# Patient Record
Sex: FEMALE | Race: WHITE | Employment: UNEMPLOYED | URBAN - METROPOLITAN AREA
[De-identification: names, ages, dates, MRNs, and addresses within clinical notes are randomized per-mention and may not be internally consistent; named-entity substitution may affect disease eponyms.]

---

## 2017-01-11 PROBLEM — O98.513 HERPES INFECTION DURING PREGNANCY IN THIRD TRIMESTER: Status: ACTIVE | Noted: 2017-01-11

## 2017-01-11 PROBLEM — B00.9 HERPES INFECTION DURING PREGNANCY IN THIRD TRIMESTER: Status: ACTIVE | Noted: 2017-01-11

## 2017-02-16 ENCOUNTER — HOSPITAL ENCOUNTER (EMERGENCY)
Age: 22
Discharge: HOME | DRG: 560 | End: 2017-02-17
Attending: OBSTETRICS & GYNECOLOGY | Admitting: OBSTETRICS & GYNECOLOGY
Payer: COMMERCIAL

## 2017-02-16 NOTE — IP AVS SNAPSHOT
[address] [place] [address] 
[phone] Patient: Joey Reyes MRN: [id number] QBE:[id number] You are allergic to the following Allergen Reactions Pertussis Vaccine,Fluid Unknown (comments) Recent Documentation Height Weight BMI OB Status Smoking Status 1.549 m 74.4 kg 30.99 kg/m2 Pregnant Former Smoker Emergency Contacts Name Discharge Info Relation Home Work Mobile [facility]  Mother [14] [phone] About your hospitalization You were admitted on:  N/A You last received care in the:  E 4 ANTEPARTUM You were discharged on:  February 17, 2017 Unit phone number:  [phone] Why you were hospitalized Your primary diagnosis was:  Not on File Providers Seen During Your Hospitalizations Provider Role Specialty Primary office phone Cassandra Garner MD Attending Provider Obstetrics & Gynecology [phone] Your Primary Care Physician (PCP) Primary Care Physician Office Phone Office Fax Federica Olguin [phone] Follow-up Information Follow up With Details Comments Contact Info Tara Dao MD   [address] [place] [id number] 
[phone] Your Appointments Wednesday February 22, 2017 10:30 AM EST  
OB Test with Cassandra Garner MD  
[facility] ([facility]) [address] [address] [phone] Current Discharge Medication List  
  
ASK your doctor about these medications Dose & Instructions Dispensing Information Comments Morning Noon Evening Bedtime  
 acyclovir 400 mg tablet Commonly known as:  ZOVIRAX Your next dose is: Today, Tomorrow Other:  _________ Take one tab PO BID for remainder of pregnancy Quantity:  60 Tab Refills:  1 prenatal no. 39-iron-FA #6-dha 30 mg iron-1 mg -300 mg Cmpk Your next dose is: Today, Tomorrow Other:  _________ Dose:  1 Cap Take 1 Cap by mouth daily. Quantity:  90 Each Refills:  4 Discharge Instructions Week 38 of Your Pregnancy: Care Instructions Your Care Instructions Believe it or not, your baby is almost here. You may have ideas about your baby's personality because of how much he or she moves. Or you may have noticed how he or she responds to sounds, warmth, cold, and light. You may even know what kind of music your baby likes. By now, you have a better idea of what to expect during delivery. You may have talked about your birth preferences with your doctor. But even if you want a vaginal birth, it is a good idea to learn about  births.  birth means that your baby is born through a cut (incision) in your lower belly. It is sometimes the best choice for the health of the baby and the mother. This care sheet can help you understand  births. It also gives you information about what to expect after your baby is born. And it helps you understand more about postpartum depression. Follow-up care is a key part of your treatment and safety. Be sure to make and go to all appointments, and call your doctor if you are having problems. It's also a good idea to know your test results and keep a list of the medicines you take. How can you care for yourself at home? Learn about  birth · Most C-sections are unplanned. They are done because of problems that occur during labor. These problems might include: 
¨ Labor that slows or stops. ¨ High blood pressure or other problems for the mother. ¨ Signs of distress in the baby. These signs may include a very fast or slow heart rate.  
· Although most mothers and babies do well after , it is major surgery. It has more risks than a vaginal delivery. · In some cases, a planned  may be safer than a vaginal delivery. This may be the case if: ¨ The mother has a health problem, such as a heart condition. ¨ The baby isn't in a head-down position for delivery. This is called a breech position. ¨ The uterus has scars from past surgeries. This could increase the chance of a tear in the uterus. ¨ There is a problem with the placenta. ¨ The mother has an infection, such as genital herpes, that could be spread to the baby. ¨ The mother is having twins or more. ¨ The baby weighs 9 to 10 pounds or more. · Because of the risks of , planned C-sections generally should be done only for medical reasons. And a planned  should be done at 39 weeks or later unless there is a medical reason to do it sooner. Know what to expect after delivery, and plan for the first few weeks at home · You, your baby, and your partner or  will get identification bands. Only people with matching bands can  the baby from the nursery. · You will learn how to feed, diaper, and bathe your baby. And you will learn how to care for the umbilical cord stump. If your baby will be circumcised, you will also learn how to care for that. · Ask people to wait to visit you until you are at home. And ask them to wash their hands before they touch your baby. · Make sure you have another adult in your home for at least 2 or 3 days after the birth. · During the first 2 weeks, limit when friends and family can visit. · Do not allow visitors who have colds or infections. Make sure all visitors are up to date with their vaccinations. Never let anyone smoke around your baby. · Try to nap when the baby naps. Be aware of postpartum depression · \"Baby blues\" are common for the first 1 to 2 weeks after birth. You may cry or feel sad or irritable for no reason. · For some women, these feelings last longer and are more intense. This is called postpartum depression. · If your symptoms last for more than a few weeks or you feel very depressed, ask your doctor for help. · Postpartum depression can be treated. Support groups and counseling can help. Sometimes medicine can also help. Where can you learn more? Go to http://abran-dar.info/. Enter B044 in the search box to learn more about \"Week 38 of Your Pregnancy: Care Instructions. \" Current as of: May 30, 2016 Content Version: 11.1 © 5054-3839 Spinback. Care instructions adapted under license by [employer] (which disclaims liability or warranty for this information). If you have questions about a medical condition or this instruction, always ask your healthcare professional. [place] [address] any warranty or liability for your use of this information. Pregnancy Precautions: Care Instructions Your Care Instructions There is no sure way to prevent labor before your due date ( labor) or to prevent most other pregnancy problems. But there are things you can do to increase your chances of a healthy pregnancy. Go to your appointments, follow your doctor's advice, and take good care of yourself. Eat well, and exercise (if your doctor agrees). And make sure to drink plenty of water. Follow-up care is a key part of your treatment and safety. Be sure to make and go to all appointments, and call your doctor if you are having problems. It's also a good idea to know your test results and keep a list of the medicines you take. How can you care for yourself at home? · Make sure you go to your prenatal appointments. At each visit, your doctor will check your blood pressure. Your doctor will also check to see if you have protein in your urine. High blood pressure and protein in urine are signs of preeclampsia.  This condition can be dangerous for you and your baby. · Drink plenty of fluids, enough so that your urine is light yellow or clear like water. Dehydration can cause contractions. If you have kidney, heart, or liver disease and have to limit fluids, talk with your doctor before you increase the amount of fluids you drink. · Tell your doctor right away if you notice any symptoms of an infection, such as: ¨ Burning when you urinate. ¨ A foul-smelling discharge from your vagina. ¨ Vaginal itching. ¨ Unexplained fever. ¨ Unusual pain or soreness in your uterus or lower belly. · Eat a balanced diet. Include plenty of foods that are high in calcium and iron. ¨ Foods high in calcium include milk, cheese, yogurt, almonds, and broccoli. ¨ Foods high in iron include red meat, shellfish, poultry, eggs, beans, raisins, whole-grain bread, and leafy green vegetables. · Do not smoke. If you need help quitting, talk to your doctor about stop-smoking programs and medicines. These can increase your chances of quitting for good. · Do not drink alcohol or use illegal drugs. · Follow your doctor's directions about activity. Your doctor will let you know how much, if any, exercise you can do. · Ask your doctor if you can have sex. If you are at risk for early labor, your doctor may ask you to not have sex. · Take care to prevent falls. During pregnancy, your joints are loose, and your balance is off. Sports such as bicycling, skiing, or in-line skating can increase your risk of falling. And don't ride horses or motorcycles, dive, water ski, scuba dive, or parachute jump while you are pregnant. · Avoid getting very hot. Do not use saunas or hot tubs. Avoid staying out in the sun in hot weather for long periods. Take acetaminophen (Tylenol) to lower a high fever. · Do not take any over-the-counter or herbal medicines or supplements without talking to your doctor or pharmacist first. 
When should you call for help? Call 911 anytime you think you may need emergency care. For example, call if: 
· You passed out (lost consciousness). · You have severe vaginal bleeding. · You have severe pain in your belly or pelvis. · You have had fluid gushing or leaking from your vagina and you know or think the umbilical cord is bulging into your vagina. If this happens, immediately get down on your knees so your rear end (buttocks) is higher than your head. This will decrease the pressure on the cord until help arrives. Call your doctor now or seek immediate medical care if: 
· You have signs of preeclampsia, such as: 
¨ Sudden swelling of your face, hands, or feet. ¨ New vision problems (such as dimness or blurring). ¨ A severe headache. · You have any vaginal bleeding. · You have belly pain or cramping. · You have a fever. · You have had regular contractions (with or without pain) for an hour. This means that you have 8 or more within 1 hour or 4 or more in 20 minutes after you change your position and drink fluids. · You have a sudden release of fluid from your vagina. · You have low back pain or pelvic pressure that does not go away. · You notice that your baby has stopped moving or is moving much less than normal. 
Watch closely for changes in your health, and be sure to contact your doctor if you have any problems. Where can you learn more? Go to http://abran-dar.info/. Enter [id number] in the search box to learn more about \"Pregnancy Precautions: Care Instructions. \" Current as of: May 30, 2016 Content Version: 11.1 © 0601-3512 [employer]iMine Software. Care instructions adapted under license by [employer] (which disclaims liability or warranty for this information). If you have questions about a medical condition or this instruction, always ask your healthcare professional. [address] any warranty or liability for your use of this information. Discharge Orders None Introducing [facility]! Dear Sukhdeep Boothe: 
Thank you for requesting a i-drive account. Our records indicate that you already have an active i-drive account. You can access your account anytime at [email or web address]. "[employer]/iVerse Media Did you know that you can access your hospital and ER discharge instructions at any time in i-drive? You can also review all of your test results from your hospital stay or ER visit. Additional Information If you have questions, please visit the Frequently Asked Questions section of the i-drive website at [email or web address]. "[email or web address]/iVerse Media/. Remember, i-drive is NOT to be used for urgent needs. For medical emergencies, dial 911. Now available from your iPhone and Android! General Information Please provide this summary of care documentation to your next provider. Patient Signature:  ____________________________________________________________ Date:  ____________________________________________________________  
  
Alpesh Leal Provider Signature:  ____________________________________________________________ Date:  ____________________________________________________________

## 2017-02-16 NOTE — IP AVS SNAPSHOT
Summary of Care Report The Summary of Care report has been created to help improve care coordination. Users with access to [employer] or [address] (Web-based application) may access additional patient information including the Discharge Summary. If you are not currently a [address] user and need more information, please call the number listed below in the [place] [address] section and ask to be connected with Medical Records. Facility Information Name Address Phone [address] [place] [address] [address] [place] [address] [phone] Patient Information Patient Name Sex JACK Irving ([id number]) Female 1995 Discharge Information Admitting Provider Service Area Unit Parish Randolph MD / [address] [facility] 4 Antepartum / [phone] Discharge Provider Discharge Date/Time Discharge Disposition Destination (none) 2017 Morning (Pending) AHR (none) Patient Language Language ENGLISH [13] Problem List as of 2017  Date Reviewed: 2017 Codes Priority Class Noted - Resolved Primigravida in third trimester ICD-10-CM: Z34.03 
ICD-9-CM: V22.0   2016 - Present Overview Addendum 2/15/2017 11:13 AM by Denae Garcia MD  
  EDC of 3/1/17 by 9 2/7 wk US Educated patient of signs and symptoms of labor including but not limited to regular uterine contractions every 5-7 minutes for 1 hour, vaginal bleeding or leakage of fluid to seek immediate care. 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  
  No prodrome or lesions noted D/W pt cont propho and if she has s/sx of ANY lesions or prodrome that C/S would be indicated You are allergic to the following Allergen Reactions Pertussis Vaccine,Fluid Unknown (comments) Current Discharge Medication List  
  
ASK your doctor about these medications Dose & Instructions Dispensing Information Comments  
 acyclovir 400 mg tablet Commonly known as:  ZOVIRAX Take one tab PO BID for remainder of pregnancy Quantity:  60 Tab Refills:  1  
   
 prenatal no. 39-iron-FA #6-dha 30 mg iron-1 mg -300 mg Cmpk Dose:  1 Cap Take 1 Cap by mouth daily. Quantity:  90 Each Refills:  4 Follow-up Information Follow up With Details Comments Contact Info Nae Liu MD   [address] [facility] [address] 
[phone] Discharge Instructions Week 38 of Your Pregnancy: Care Instructions Your Care Instructions Believe it or not, your baby is almost here. You may have ideas about your baby's personality because of how much he or she moves. Or you may have noticed how he or she responds to sounds, warmth, cold, and light. You may even know what kind of music your baby likes. By now, you have a better idea of what to expect during delivery. You may have talked about your birth preferences with your doctor. But even if you want a vaginal birth, it is a good idea to learn about  births.  birth means that your baby is born through a cut (incision) in your lower belly. It is sometimes the best choice for the health of the baby and the mother. This care sheet can help you understand  births. It also gives you information about what to expect after your baby is born. And it helps you understand more about postpartum depression. Follow-up care is a key part of your treatment and safety. Be sure to make and go to all appointments, and call your doctor if you are having problems. It's also a good idea to know your test results and keep a list of the medicines you take. How can you care for yourself at home? Learn about  birth · Most C-sections are unplanned. They are done because of problems that occur during labor. These problems might include: 
¨ Labor that slows or stops. ¨ High blood pressure or other problems for the mother. ¨ Signs of distress in the baby. These signs may include a very fast or slow heart rate. · Although most mothers and babies do well after , it is major surgery. It has more risks than a vaginal delivery. · In some cases, a planned  may be safer than a vaginal delivery. This may be the case if: ¨ The mother has a health problem, such as a heart condition. ¨ The baby isn't in a head-down position for delivery. This is called a breech position. ¨ The uterus has scars from past surgeries. This could increase the chance of a tear in the uterus. ¨ There is a problem with the placenta. ¨ The mother has an infection, such as genital herpes, that could be spread to the baby. ¨ The mother is having twins or more. ¨ The baby weighs 9 to 10 pounds or more. · Because of the risks of , planned C-sections generally should be done only for medical reasons. And a planned  should be done at 39 weeks or later unless there is a medical reason to do it sooner. Know what to expect after delivery, and plan for the first few weeks at home · You, your baby, and your partner or  will get identification bands. Only people with matching bands can  the baby from the nursery. · You will learn how to feed, diaper, and bathe your baby. And you will learn how to care for the umbilical cord stump. If your baby will be circumcised, you will also learn how to care for that. · Ask people to wait to visit you until you are at home. And ask them to wash their hands before they touch your baby. · Make sure you have another adult in your home for at least 2 or 3 days after the birth. · During the first 2 weeks, limit when friends and family can visit. · Do not allow visitors who have colds or infections. Make sure all visitors are up to date with their vaccinations. Never let anyone smoke around your baby. · Try to nap when the baby naps. Be aware of postpartum depression · \"Baby blues\" are common for the first 1 to 2 weeks after birth. You may cry or feel sad or irritable for no reason. · For some women, these feelings last longer and are more intense. This is called postpartum depression. · If your symptoms last for more than a few weeks or you feel very depressed, ask your doctor for help. · Postpartum depression can be treated. Support groups and counseling can help. Sometimes medicine can also help. Where can you learn more? Go to http://abran-dar.info/. Enter B044 in the search box to learn more about \"Week 38 of Your Pregnancy: Care Instructions. \" Current as of: May 30, 2016 Content Version: 11.1 © 7410-1006 Digital Map Products. Care instructions adapted under license by [employer] (which disclaims liability or warranty for this information). If you have questions about a medical condition or this instruction, always ask your healthcare professional. [place] [address] any warranty or liability for your use of this information. Pregnancy Precautions: Care Instructions Your Care Instructions There is no sure way to prevent labor before your due date ( labor) or to prevent most other pregnancy problems. But there are things you can do to increase your chances of a healthy pregnancy. Go to your appointments, follow your doctor's advice, and take good care of yourself. Eat well, and exercise (if your doctor agrees). And make sure to drink plenty of water. Follow-up care is a key part of your treatment and safety.  Be sure to make and go to all appointments, and call your doctor if you are having problems. It's also a good idea to know your test results and keep a list of the medicines you take. How can you care for yourself at home? · Make sure you go to your prenatal appointments. At each visit, your doctor will check your blood pressure. Your doctor will also check to see if you have protein in your urine. High blood pressure and protein in urine are signs of preeclampsia. This condition can be dangerous for you and your baby. · Drink plenty of fluids, enough so that your urine is light yellow or clear like water. Dehydration can cause contractions. If you have kidney, heart, or liver disease and have to limit fluids, talk with your doctor before you increase the amount of fluids you drink. · Tell your doctor right away if you notice any symptoms of an infection, such as: ¨ Burning when you urinate. ¨ A foul-smelling discharge from your vagina. ¨ Vaginal itching. ¨ Unexplained fever. ¨ Unusual pain or soreness in your uterus or lower belly. · Eat a balanced diet. Include plenty of foods that are high in calcium and iron. ¨ Foods high in calcium include milk, cheese, yogurt, almonds, and broccoli. ¨ Foods high in iron include red meat, shellfish, poultry, eggs, beans, raisins, whole-grain bread, and leafy green vegetables. · Do not smoke. If you need help quitting, talk to your doctor about stop-smoking programs and medicines. These can increase your chances of quitting for good. · Do not drink alcohol or use illegal drugs. · Follow your doctor's directions about activity. Your doctor will let you know how much, if any, exercise you can do. · Ask your doctor if you can have sex. If you are at risk for early labor, your doctor may ask you to not have sex. · Take care to prevent falls. During pregnancy, your joints are loose, and your balance is off. Sports such as bicycling, skiing, or in-line skating can increase your risk of falling.  And don't ride horses or motorcycles, dive, water ski, scuba dive, or parachute jump while you are pregnant. · Avoid getting very hot. Do not use saunas or hot tubs. Avoid staying out in the sun in hot weather for long periods. Take acetaminophen (Tylenol) to lower a high fever. · Do not take any over-the-counter or herbal medicines or supplements without talking to your doctor or pharmacist first. 
When should you call for help? Call 911 anytime you think you may need emergency care. For example, call if: 
· You passed out (lost consciousness). · You have severe vaginal bleeding. · You have severe pain in your belly or pelvis. · You have had fluid gushing or leaking from your vagina and you know or think the umbilical cord is bulging into your vagina. If this happens, immediately get down on your knees so your rear end (buttocks) is higher than your head. This will decrease the pressure on the cord until help arrives. Call your doctor now or seek immediate medical care if: 
· You have signs of preeclampsia, such as: 
¨ Sudden swelling of your face, hands, or feet. ¨ New vision problems (such as dimness or blurring). ¨ A severe headache. · You have any vaginal bleeding. · You have belly pain or cramping. · You have a fever. · You have had regular contractions (with or without pain) for an hour. This means that you have 8 or more within 1 hour or 4 or more in 20 minutes after you change your position and drink fluids. · You have a sudden release of fluid from your vagina. · You have low back pain or pelvic pressure that does not go away. · You notice that your baby has stopped moving or is moving much less than normal. 
Watch closely for changes in your health, and be sure to contact your doctor if you have any problems. Where can you learn more? Go to http://abran-dar.info/. Enter [id number] in the search box to learn more about \"Pregnancy Precautions: Care Instructions. \" Current as of: May 30, 2016 Content Version: 11.1 © 6941-1290 [employer]. Care instructions adapted under license by [employer] (which disclaims liability or warranty for this information). If you have questions about a medical condition or this instruction, always ask your healthcare professional. [address] any warranty or liability for your use of this information. Chart Review Routing History No Routing History on File

## 2017-02-17 ENCOUNTER — ANESTHESIA (OUTPATIENT)
Dept: LABOR AND DELIVERY | Age: 22
DRG: 560 | End: 2017-02-17
Payer: COMMERCIAL

## 2017-02-17 ENCOUNTER — ANESTHESIA EVENT (OUTPATIENT)
Dept: LABOR AND DELIVERY | Age: 22
DRG: 560 | End: 2017-02-17
Payer: COMMERCIAL

## 2017-02-17 ENCOUNTER — HOSPITAL ENCOUNTER (INPATIENT)
Age: 22
LOS: 4 days | Discharge: HOME | DRG: 560 | End: 2017-02-21
Attending: OBSTETRICS & GYNECOLOGY | Admitting: OBSTETRICS & GYNECOLOGY
Payer: COMMERCIAL

## 2017-02-17 VITALS
HEIGHT: 61 IN | BODY MASS INDEX: 30.96 KG/M2 | DIASTOLIC BLOOD PRESSURE: 77 MMHG | HEART RATE: 75 BPM | WEIGHT: 164 LBS | TEMPERATURE: 98.2 F | SYSTOLIC BLOOD PRESSURE: 117 MMHG

## 2017-02-17 PROBLEM — Z37.9 NORMAL LABOR: Status: ACTIVE | Noted: 2017-02-17

## 2017-02-17 PROBLEM — Z37.9 NORMAL LABOR: Status: RESOLVED | Noted: 2017-02-17 | Resolved: 2017-02-17

## 2017-02-17 LAB
ABO + RH BLD: NORMAL
BACTERIA SPEC CULT: NORMAL
BLOOD GROUP ANTIBODIES SERPL: NORMAL
ERYTHROCYTE [DISTWIDTH] IN BLOOD BY AUTOMATED COUNT: 13.3 % (ref 11.9–14.6)
ERYTHROCYTE [DISTWIDTH] IN BLOOD BY AUTOMATED COUNT: 13.7 % (ref 11.9–14.6)
FLUAV AG NPH QL IA: NEGATIVE
FLUBV AG NPH QL IA: NEGATIVE
HCT VFR BLD AUTO: 36.9 % (ref 35.8–46.3)
HCT VFR BLD AUTO: 37.5 % (ref 35.8–46.3)
HGB BLD-MCNC: 12.8 G/DL (ref 11.7–15.4)
HGB BLD-MCNC: 13.2 G/DL (ref 11.7–15.4)
MCH RBC QN AUTO: 32 PG (ref 26.1–32.9)
MCH RBC QN AUTO: 32 PG (ref 26.1–32.9)
MCHC RBC AUTO-ENTMCNC: 34.7 G/DL (ref 31.4–35)
MCHC RBC AUTO-ENTMCNC: 35.2 G/DL (ref 31.4–35)
MCV RBC AUTO: 90.8 FL (ref 79.6–97.8)
MCV RBC AUTO: 92.3 FL (ref 79.6–97.8)
PLATELET # BLD AUTO: 115 K/UL (ref 150–450)
PLATELET # BLD AUTO: 136 K/UL (ref 150–450)
PMV BLD AUTO: 11.7 FL (ref 10.8–14.1)
PMV BLD AUTO: 12 FL (ref 10.8–14.1)
RBC # BLD AUTO: 4 M/UL (ref 4.05–5.25)
RBC # BLD AUTO: 4.13 M/UL (ref 4.05–5.25)
SERVICE CMNT-IMP: NORMAL
SPECIMEN EXP DATE BLD: NORMAL
WBC # BLD AUTO: 16.7 K/UL (ref 4.3–11.1)
WBC # BLD AUTO: 20.8 K/UL (ref 4.3–11.1)

## 2017-02-17 PROCEDURE — 4A1HXCZ MONITORING OF PRODUCTS OF CONCEPTION, CARDIAC RATE, EXTERNAL APPROACH: ICD-10-PCS | Performed by: OBSTETRICS & GYNECOLOGY

## 2017-02-17 PROCEDURE — 77030014125 HC TY EPDRL BBMI -B: Performed by: ANESTHESIOLOGY

## 2017-02-17 PROCEDURE — 74011250636 HC RX REV CODE- 250/636

## 2017-02-17 PROCEDURE — 86900 BLOOD TYPING SEROLOGIC ABO: CPT | Performed by: OBSTETRICS & GYNECOLOGY

## 2017-02-17 PROCEDURE — 74011250636 HC RX REV CODE- 250/636: Performed by: OBSTETRICS & GYNECOLOGY

## 2017-02-17 PROCEDURE — 99283 EMERGENCY DEPT VISIT LOW MDM: CPT

## 2017-02-17 PROCEDURE — 74011250637 HC RX REV CODE- 250/637: Performed by: OBSTETRICS & GYNECOLOGY

## 2017-02-17 PROCEDURE — 77010026065 HC OXYGEN MINIMUM MEDICAL AIR

## 2017-02-17 PROCEDURE — 65270000029 HC RM PRIVATE

## 2017-02-17 PROCEDURE — 75410000002 HC LABOR FEE PER 1 HR

## 2017-02-17 PROCEDURE — 87040 BLOOD CULTURE FOR BACTERIA: CPT | Performed by: OBSTETRICS & GYNECOLOGY

## 2017-02-17 PROCEDURE — 77030018846 HC SOL IRR STRL H20 ICUM -A

## 2017-02-17 PROCEDURE — 10907ZC DRAINAGE OF AMNIOTIC FLUID, THERAPEUTIC FROM PRODUCTS OF CONCEPTION, VIA NATURAL OR ARTIFICIAL OPENING: ICD-10-PCS | Performed by: OBSTETRICS & GYNECOLOGY

## 2017-02-17 PROCEDURE — 74011258636 HC RX REV CODE- 258/636: Performed by: OBSTETRICS & GYNECOLOGY

## 2017-02-17 PROCEDURE — 87641 MR-STAPH DNA AMP PROBE: CPT | Performed by: OBSTETRICS & GYNECOLOGY

## 2017-02-17 PROCEDURE — 77030005518 HC CATH URETH FOL 2W BARD -B

## 2017-02-17 PROCEDURE — 85027 COMPLETE CBC AUTOMATED: CPT | Performed by: OBSTETRICS & GYNECOLOGY

## 2017-02-17 PROCEDURE — 75410000003 HC RECOV DEL/VAG/CSECN EA 0.5 HR

## 2017-02-17 PROCEDURE — 77030011945 HC CATH URIN INT ST MENT -A

## 2017-02-17 PROCEDURE — A4300 CATH IMPL VASC ACCESS PORTAL: HCPCS | Performed by: ANESTHESIOLOGY

## 2017-02-17 PROCEDURE — 36415 COLL VENOUS BLD VENIPUNCTURE: CPT | Performed by: OBSTETRICS & GYNECOLOGY

## 2017-02-17 PROCEDURE — 76060000078 HC EPIDURAL ANESTHESIA

## 2017-02-17 PROCEDURE — 87804 INFLUENZA ASSAY W/OPTIC: CPT | Performed by: OBSTETRICS & GYNECOLOGY

## 2017-02-17 PROCEDURE — 77030002888 HC SUT CHRMC J&J -A

## 2017-02-17 PROCEDURE — 74011250636 HC RX REV CODE- 250/636: Performed by: ANESTHESIOLOGY

## 2017-02-17 PROCEDURE — 87205 SMEAR GRAM STAIN: CPT | Performed by: OBSTETRICS & GYNECOLOGY

## 2017-02-17 PROCEDURE — 77030011943

## 2017-02-17 PROCEDURE — 76815 OB US LIMITED FETUS(S): CPT

## 2017-02-17 PROCEDURE — 75410000000 HC DELIVERY VAGINAL/SINGLE

## 2017-02-17 PROCEDURE — 59025 FETAL NON-STRESS TEST: CPT

## 2017-02-17 PROCEDURE — 0HQ9XZZ REPAIR PERINEUM SKIN, EXTERNAL APPROACH: ICD-10-PCS | Performed by: OBSTETRICS & GYNECOLOGY

## 2017-02-17 PROCEDURE — 87086 URINE CULTURE/COLONY COUNT: CPT | Performed by: OBSTETRICS & GYNECOLOGY

## 2017-02-17 RX ORDER — SODIUM CHLORIDE 0.9 % (FLUSH) 0.9 %
5-10 SYRINGE (ML) INJECTION EVERY 8 HOURS
Status: DISCONTINUED | OUTPATIENT
Start: 2017-02-17 | End: 2017-02-17 | Stop reason: HOSPADM

## 2017-02-17 RX ORDER — SODIUM CHLORIDE 0.9 % (FLUSH) 0.9 %
5-10 SYRINGE (ML) INJECTION AS NEEDED
Status: DISCONTINUED | OUTPATIENT
Start: 2017-02-17 | End: 2017-02-17 | Stop reason: HOSPADM

## 2017-02-17 RX ORDER — FENTANYL CITRATE 50 UG/ML
INJECTION, SOLUTION INTRAMUSCULAR; INTRAVENOUS AS NEEDED
Status: DISCONTINUED | OUTPATIENT
Start: 2017-02-17 | End: 2017-02-17 | Stop reason: HOSPADM

## 2017-02-17 RX ORDER — SIMETHICONE 80 MG
80 TABLET,CHEWABLE ORAL
Status: DISCONTINUED | OUTPATIENT
Start: 2017-02-17 | End: 2017-02-21 | Stop reason: HOSPADM

## 2017-02-17 RX ORDER — BUTORPHANOL TARTRATE 1 MG/ML
1 INJECTION INTRAMUSCULAR; INTRAVENOUS ONCE
Status: COMPLETED | OUTPATIENT
Start: 2017-02-17 | End: 2017-02-17

## 2017-02-17 RX ORDER — IBUPROFEN 800 MG/1
800 TABLET ORAL
Status: DISCONTINUED | OUTPATIENT
Start: 2017-02-17 | End: 2017-02-18

## 2017-02-17 RX ORDER — ROPIVACAINE HYDROCHLORIDE 2 MG/ML
INJECTION, SOLUTION EPIDURAL; INFILTRATION; PERINEURAL AS NEEDED
Status: DISCONTINUED | OUTPATIENT
Start: 2017-02-17 | End: 2017-02-17

## 2017-02-17 RX ORDER — OXYTOCIN/RINGER'S LACTATE 30/500 ML
.5-2 PLASTIC BAG, INJECTION (ML) INTRAVENOUS
Status: DISCONTINUED | OUTPATIENT
Start: 2017-02-17 | End: 2017-02-17

## 2017-02-17 RX ORDER — ROPIVACAINE HYDROCHLORIDE 2 MG/ML
INJECTION, SOLUTION EPIDURAL; INFILTRATION; PERINEURAL
Status: DISCONTINUED | OUTPATIENT
Start: 2017-02-17 | End: 2017-02-17 | Stop reason: HOSPADM

## 2017-02-17 RX ORDER — PRENATAL VIT 96/IRON FUM/FOLIC 27MG-0.8MG
1 TABLET ORAL DAILY
Status: DISCONTINUED | OUTPATIENT
Start: 2017-02-18 | End: 2017-02-21 | Stop reason: HOSPADM

## 2017-02-17 RX ORDER — DEXTROSE, SODIUM CHLORIDE, SODIUM LACTATE, POTASSIUM CHLORIDE, AND CALCIUM CHLORIDE 5; .6; .31; .03; .02 G/100ML; G/100ML; G/100ML; G/100ML; G/100ML
125 INJECTION, SOLUTION INTRAVENOUS CONTINUOUS
Status: DISCONTINUED | OUTPATIENT
Start: 2017-02-17 | End: 2017-02-17 | Stop reason: HOSPADM

## 2017-02-17 RX ORDER — BUTORPHANOL TARTRATE 1 MG/ML
1 INJECTION INTRAMUSCULAR; INTRAVENOUS
Status: DISCONTINUED | OUTPATIENT
Start: 2017-02-17 | End: 2017-02-17 | Stop reason: HOSPADM

## 2017-02-17 RX ORDER — MINERAL OIL
120 OIL (ML) ORAL
Status: COMPLETED | OUTPATIENT
Start: 2017-02-17 | End: 2017-02-17

## 2017-02-17 RX ORDER — OXYTOCIN/RINGER'S LACTATE 30/500 ML
PLASTIC BAG, INJECTION (ML) INTRAVENOUS
Status: DISCONTINUED
Start: 2017-02-17 | End: 2017-02-17

## 2017-02-17 RX ORDER — LIDOCAINE HYDROCHLORIDE 20 MG/ML
JELLY TOPICAL
Status: DISCONTINUED | OUTPATIENT
Start: 2017-02-17 | End: 2017-02-17 | Stop reason: HOSPADM

## 2017-02-17 RX ORDER — OXYTOCIN/RINGER'S LACTATE 15/250 ML
250 PLASTIC BAG, INJECTION (ML) INTRAVENOUS ONCE
Status: ACTIVE | OUTPATIENT
Start: 2017-02-17 | End: 2017-02-17

## 2017-02-17 RX ORDER — LIDOCAINE HYDROCHLORIDE 10 MG/ML
1 INJECTION INFILTRATION; PERINEURAL
Status: DISCONTINUED | OUTPATIENT
Start: 2017-02-17 | End: 2017-02-17 | Stop reason: HOSPADM

## 2017-02-17 RX ORDER — PROMETHAZINE HYDROCHLORIDE 25 MG/ML
12.5 INJECTION, SOLUTION INTRAMUSCULAR; INTRAVENOUS ONCE
Status: COMPLETED | OUTPATIENT
Start: 2017-02-17 | End: 2017-02-17

## 2017-02-17 RX ORDER — HYDROCODONE BITARTRATE AND ACETAMINOPHEN 5; 325 MG/1; MG/1
1 TABLET ORAL
Status: DISCONTINUED | OUTPATIENT
Start: 2017-02-17 | End: 2017-02-18

## 2017-02-17 RX ORDER — ACYCLOVIR 200 MG/1
400 CAPSULE ORAL 2 TIMES DAILY
Status: DISCONTINUED | OUTPATIENT
Start: 2017-02-18 | End: 2017-02-18

## 2017-02-17 RX ORDER — OXYTOCIN/RINGER'S LACTATE 15/250 ML
250 PLASTIC BAG, INJECTION (ML) INTRAVENOUS ONCE
Status: ACTIVE | OUTPATIENT
Start: 2017-02-18 | End: 2017-02-18

## 2017-02-17 RX ORDER — FAMOTIDINE 20 MG/1
20 TABLET, FILM COATED ORAL ONCE
Status: DISCONTINUED | OUTPATIENT
Start: 2017-02-17 | End: 2017-02-17

## 2017-02-17 RX ORDER — ACETAMINOPHEN 500 MG
1000 TABLET ORAL ONCE
Status: COMPLETED | OUTPATIENT
Start: 2017-02-17 | End: 2017-02-17

## 2017-02-17 RX ADMIN — SODIUM CHLORIDE, SODIUM LACTATE, POTASSIUM CHLORIDE, CALCIUM CHLORIDE, AND DEXTROSE MONOHYDRATE 125 ML/HR: 600; 310; 30; 20; 5 INJECTION, SOLUTION INTRAVENOUS at 10:57

## 2017-02-17 RX ADMIN — SODIUM CHLORIDE, SODIUM LACTATE, POTASSIUM CHLORIDE, AND CALCIUM CHLORIDE 1000 ML: 600; 310; 30; 20 INJECTION, SOLUTION INTRAVENOUS at 11:28

## 2017-02-17 RX ADMIN — BUTORPHANOL TARTRATE 1 MG: 1 INJECTION, SOLUTION INTRAMUSCULAR; INTRAVENOUS at 02:18

## 2017-02-17 RX ADMIN — SODIUM CHLORIDE, SODIUM LACTATE, POTASSIUM CHLORIDE, AND CALCIUM CHLORIDE 1000 ML: 600; 310; 30; 20 INJECTION, SOLUTION INTRAVENOUS at 10:58

## 2017-02-17 RX ADMIN — OXYTOCIN 2 MILLI-UNITS/MIN: 10 INJECTION, SOLUTION INTRAMUSCULAR; INTRAVENOUS at 14:05

## 2017-02-17 RX ADMIN — MINERAL OIL 120 ML: 471.95 OIL ORAL at 21:00

## 2017-02-17 RX ADMIN — SODIUM CHLORIDE, SODIUM LACTATE, POTASSIUM CHLORIDE, CALCIUM CHLORIDE, AND DEXTROSE MONOHYDRATE 125 ML/HR: 600; 310; 30; 20; 5 INJECTION, SOLUTION INTRAVENOUS at 18:19

## 2017-02-17 RX ADMIN — ROPIVACAINE HYDROCHLORIDE 8 ML/HR: 2 INJECTION, SOLUTION EPIDURAL; INFILTRATION; PERINEURAL at 10:41

## 2017-02-17 RX ADMIN — ACETAMINOPHEN 1000 MG: 500 TABLET, FILM COATED ORAL at 16:07

## 2017-02-17 RX ADMIN — PROMETHAZINE HYDROCHLORIDE 12.5 MG: 25 INJECTION INTRAMUSCULAR; INTRAVENOUS at 02:18

## 2017-02-17 RX ADMIN — FENTANYL CITRATE 100 MCG: 50 INJECTION, SOLUTION INTRAMUSCULAR; INTRAVENOUS at 19:57

## 2017-02-17 NOTE — ANESTHESIA PREPROCEDURE EVALUATION
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            Physical Exam    Airway  Mallampati: II  TM Distance: 4 - 6 cm  Neck ROM: normal range of motion   Mouth opening: Normal     Cardiovascular    Rhythm: regular           Dental  No notable dental hx       Pulmonary                 Abdominal         Other Findings            Anesthetic Plan    ASA: 2  Anesthesia type: epidural            Anesthetic plan and risks discussed with: Patient, Spouse and Mother

## 2017-02-17 NOTE — ANESTHESIA PROCEDURE NOTES
Epidural Block    Performed by: Veronica Holman  Authorized by: Veronica Holman     Pre-Procedure  Indication: labor epidural    Preanesthetic Checklist: patient identified, risks and benefits discussed, anesthesia consent, patient being monitored, timeout performed and anesthesia consent    Timeout Time: 10:34        Epidural:   Patient position:  Seated  Prep region:  Lumbar  Prep: Patient draped and Chlorhexidine    Location:  L3-4    Needle and Epidural Catheter:   Needle Type:  Tuohy  Needle Gauge:  17 G  Injection Technique:  Loss of resistance using air  Attempts:  1  Catheter Size:  19 G  Events: no blood with aspiration, no cerebrospinal fluid with aspiration, no paresthesia and negative aspiration test    Test Dose:  Lidocaine 1.5% w/ epi and negative    Assessment:   Catheter Secured:  Tegaderm and tape  Insertion:  Uncomplicated  Patient tolerance:  Patient tolerated the procedure well with no immediate complications  All needles out intact, procedure tolerated well without problems

## 2017-02-17 NOTE — PROGRESS NOTES
Pt seen and examined as f/u from Dr. Catarina Will evaluation at 0700. Pt is 24 yo G1 at 38.2 presenting multiple times overnight with painful contractions. Pt states she is in pain and can not continue like this. No bleeding, no LOF    Gen writhing with contractions  Abd gravid, NT   4/80/-2 vtx, intact, adequate pelvis, EFW 6+10    Bedside US shows IUP cephalic pres, ant, grade 3 placenta, VERONICA 10 cm    A/ 22 o G1 at 38.2 admitted in labor  Plan/  1. Fetal status reassuring  2. GBS negative  3. Admit for routine intrapartum care, primary OB aware. 4. Epidural for pain control ASAP  5. Anxiety  6.  HSV- has been on suppression, no prodromal sx, exam done by Dr. Balinda Moritz at 0100 today was negative for Meena Boss MD

## 2017-02-17 NOTE — PROGRESS NOTES
Labor Progress Note  Patient seen, fetal heart rate and contraction pattern evaluated, patient examined.   Patient Vitals for the past 1 hrs:   BP Temp Pulse   02/17/17 1541 - (!) 103.1 °F (39.5 °C) -   02/17/17 1531 123/62 - 91       Physical Exam:  Cervical Exam:  7-8 cm dilated    100% effaced    -1 station      Uterine Activity: Frequency: Every 2-4 minutes and Intensity: moderate  Fetal Heart Rate: tachycardia most likely due to maternal fever    Variability: moderate, minimal      Assessment/Plan:  Blood and urine cultures obtained   Swab for Flu  Tylenol for fever  Continue plan for vaginal delivery

## 2017-02-17 NOTE — PROGRESS NOTES
02/17/17 1730   Cervical Exam   Dilation (cm) 8   Eff 100 %   Station 0   Repositioned to high fowlers position

## 2017-02-17 NOTE — PROGRESS NOTES
02/17/17 1545   Straight Cath   Straight Cath Nurse performed cath   Number of Attempts 1   Catheter Size 16 FR   Time Catheter Inserted 1545   Time Catheter Removed 1546   Urine 75 mL   Urine Assessment   Urinary Status Straight cath   Urine Color Jenna   Urine Appearance Clear   Urine culture collected and sent to lab

## 2017-02-17 NOTE — PROGRESS NOTES
Faviola Colunga at bedside at 21 . SHAI Gayle at bedside at 1038     Assisted pt to sitting up on bedside at 1030.     Timeout completed at 1031 with MD, SHAI and myself at bedside.     Test dose given at 1040. Negative reaction.     Dose given at 1043.     Pt assisted to lying back in left tilt position.     See anesthesia record for details. See vital sign flow sheet for BP.     Tolerated procedure well.

## 2017-02-17 NOTE — PROGRESS NOTES
Pt transferred to room 436 for labor. Iv started and oriented to plan of care. Pt would like her epidural asap.

## 2017-02-17 NOTE — PROGRESS NOTES
Patient discharged to home undelivered with instructions to return for signs of labor. Voiced understanding. Patient left ambulatory accompanied by FOB and patient's mother.

## 2017-02-17 NOTE — IP AVS SNAPSHOT
Current Discharge Medication List  
  
Take these medications at their scheduled times Dose & Instructions Dispensing Information Comments Morning Noon Evening Bedtime  
 citalopram 20 mg tablet Commonly known as:  Ruth Gonzalez Your next dose is: Today, Tomorrow Other:  ____________ Dose:  20 mg Take 1 Tab by mouth daily. Quantity:  30 Tab Refills:  2  
     
   
   
   
  
 norethindrone 0.35 mg Tab Commonly known as:  Gaudencio & Gaudencio Your next dose is: Today, Tomorrow Other:  ____________ Dose:  1 Tab Take 1 Tab by mouth daily. Quantity:  1 Package Refills:  12  
     
   
   
   
  
 prenatal no. 39-iron-FA #6-dha 30 mg iron-1 mg -300 mg Cmpk Your next dose is: Today, Tomorrow Other:  ____________ Dose:  1 Cap Take 1 Cap by mouth daily. Quantity:  90 Each Refills:  4 Take these medications as needed Dose & Instructions Dispensing Information Comments Morning Noon Evening Bedtime  
 ibuprofen 600 mg tablet Commonly known as:  MOTRIN Your next dose is: Today, Tomorrow Other:  ____________ Dose:  600 mg Take 1 Tab by mouth every six (6) hours as needed for Pain. Quantity:  60 Tab Refills:  2  
     
   
   
   
  
 oxyCODONE IR 5 mg immediate release tablet Commonly known as:  Chato Lambert Your next dose is: Today, Tomorrow Other:  ____________ Dose:  5 mg Take 1 Tab by mouth every eight (8) hours as needed. Max Daily Amount: 15 mg. Quantity:  24 Tab Refills:  0 Where to Get Your Medications These medications were sent to [address] [facility], [address] at [facility]  [address] [place] [address] Phone:  [phone]  
  citalopram 20 mg tablet  
 ibuprofen 600 mg tablet  
 norethindrone 0.35 mg Tab Information about where to get these medications is not yet available ! Ask your nurse or doctor about these medications  
  oxyCODONE IR 5 mg immediate release tablet

## 2017-02-17 NOTE — PROGRESS NOTES
Dr Katheran Severance at bedside. Pt checked for active herpes lesions. None noted. SVE 3-3.5/70/-1. Will let patient walk for an hour and recheck cervix.

## 2017-02-17 NOTE — PROGRESS NOTES
Reactive NST. Pt oob to walk. Pt walking in her room currently. Will continue to monitor x2 hours per orders of Dr Bandar Madison.

## 2017-02-17 NOTE — IP AVS SNAPSHOT
[address] 
[phone] Patient: Shirley Reveles MRN: [id number] HYT: You are allergic to the following Allergen Reactions Pertussis Vaccine,Fluid Unknown (comments) Recent Documentation Weight Breastfeeding? BMI OB Status Smoking Status 74.4 kg Unknown 30.99 kg/m2 Recent pregnancy Former Smoker Unresulted Labs Order Current Status AEROBIC SUSCEPTIBILITY ID In process CULTURE, BLOOD Preliminary result CULTURE, BLOOD Preliminary result Emergency Contacts Name Discharge Info Relation Home Work Mobile [facility]  Mother [14] [phone] About your hospitalization You were admitted on:  2017 You last received care in the:  [address] [facility] You were discharged on:  2017 Unit phone number:  [phone] Why you were hospitalized Your primary diagnosis was:  Normal Labor Your diagnoses also included:  Herpes Infection During Pregnancy In Third Trimester,  (Spontaneous Vaginal Delivery), Maternal Fever During Labor, Bacteremia Due To Gram-Positive Bacteria Providers Seen During Your Hospitalizations Provider Role Specialty Primary office phone Alexandria Norman MD Attending Provider Obstetrics & Gynecology [phone] Your Primary Care Physician (PCP) Primary Care Physician Office Phone Office Fax [facility] [phone] Follow-up Information Follow up With Details Comments Contact Info Willian Hong MD   [facility] [address] 
[phone] Alexandria Norman MD In 6 weeks  [address] [place] [address] [facility] [address] 
[phone] Current Discharge Medication List  
  
START taking these medications Dose & Instructions Dispensing Information Comments Morning Noon Evening Bedtime  
 citalopram 20 mg tablet Commonly known as:  Anahola Party Your next dose is: Today, Tomorrow Other:  _________ Dose:  20 mg Take 1 Tab by mouth daily. Quantity:  30 Tab Refills:  2  
     
   
   
   
  
 ibuprofen 600 mg tablet Commonly known as:  MOTRIN Your next dose is: Today, Tomorrow Other:  _________ Dose:  600 mg Take 1 Tab by mouth every six (6) hours as needed for Pain. Quantity:  60 Tab Refills:  2  
     
   
   
   
  
 norethindrone 0.35 mg Tab Commonly known as:  Gaudencio & Gaudencio Your next dose is: Today, Tomorrow Other:  _________ Dose:  1 Tab Take 1 Tab by mouth daily. Quantity:  1 Package Refills:  12  
     
   
   
   
  
 oxyCODONE IR 5 mg immediate release tablet Commonly known as:  Dondra Concepción Your next dose is: Today, Tomorrow Other:  _________ Dose:  5 mg Take 1 Tab by mouth every eight (8) hours as needed. Max Daily Amount: 15 mg. Quantity:  24 Tab Refills:  0 CONTINUE these medications which have NOT CHANGED Dose & Instructions Dispensing Information Comments Morning Noon Evening Bedtime  
 prenatal no. 39-iron-FA #6-dha 30 mg iron-1 mg -300 mg Cmpk Your next dose is: Today, Tomorrow Other:  _________ Dose:  1 Cap Take 1 Cap by mouth daily. Quantity:  90 Each Refills:  4 STOP taking these medications   
 acyclovir 400 mg tablet Commonly known as:  ZOVIRAX Where to Get Your Medications These medications were sent to [facility] [address] at [address] [address] Phone:  [phone]  
  citalopram 20 mg tablet  
 ibuprofen 600 mg tablet  
 norethindrone 0.35 mg Tab Information on where to get these meds will be given to you by the nurse or doctor. ! Ask your nurse or doctor about these medications  
  oxyCODONE IR 5 mg immediate release tablet Discharge Instructions Discharge instruction to follow: Activity: Pelvis rest for 6 weeks No heavy lifting over 15 lbs for 2 weeks No driving for 2 weeks No push/pull motion such as sweeping or vacuuming for 2 weeks No tub baths for 6 weeks If using pierre-bottle continue to use until comfortable stopping. Change sanitary pad after each urination or bowel movement. Call MD for the following: 
    Fever over 101 F; pain not relieved by medication; foul smelling vaginal discharge or an increase in vaginal bleeding. Take medication as prescribed. Follow up with MD as order. After Your Delivery (the Postpartum Period): Care Instructions Your Care Instructions Congratulations on the birth of your baby. Like pregnancy, the  period can be a time of excitement, cortney, and exhaustion. You may look at your wondrous little baby and feel happy. You may also be overwhelmed by your new sleep hours and new responsibilities. At first, babies often sleep during the days and are awake at night. They do not have a pattern or routine. They may make sudden gasps, jerk themselves awake, or look like they have crossed eyes. These are all normal, and they may even make you smile. In these first weeks after delivery, try to take good care of yourself. It may take 4 to 6 weeks to feel like yourself again, and possibly longer if you had a  birth. You will likely feel very tired for several weeks. Your days will be full of ups and downs, but lots of cortney as well. Follow-up care is a key part of your treatment and safety. Be sure to make and go to all appointments, and call your doctor if you are having problems. It's also a good idea to know your test results and keep a list of the medicines you take. How can you care for yourself at home? Take care of your body after delivery · Use pads instead of tampons for the bloody flow that may last as long as 2 weeks. · Ease cramps with ibuprofen (Advil, Motrin). · Ease soreness of hemorrhoids and the area between your vagina and rectum with ice compresses or witch hazel pads. · Ease constipation by drinking lots of fluid and eating high-fiber foods. Ask your doctor about over-the-counter stool softeners. · Cleanse yourself with a gentle squeeze of warm water from a bottle instead of wiping with toilet paper. · Take a sitz bath in warm water several times a day. · Wear a good nursing bra. Ease sore and swollen breasts with warm, wet washcloths. · If you are not breastfeeding, use ice rather than heat for breast soreness. · Your period may not start for several months if you are breastfeeding. You may bleed more, and longer at first, than you did before you got pregnant. · Wait until you are healed (about 4 to 6 weeks) before you have sexual intercourse. Your doctor will tell you when it is okay to have sex. · Try not to travel with your baby for 5 or 6 weeks. If you take a long car trip, make frequent stops to walk around and stretch. Avoid exhaustion · Rest every day. Try to nap when your baby naps. · Ask another adult to be with you for a few days after delivery. · Plan for  if you have other children. · Stay flexible so you can eat at odd hours and sleep when you need to. Both you and your baby are making new schedules. · Plan small trips to get out of the house. Change can make you feel less tired. · Ask for help with housework, cooking, and shopping. Remind yourself that your job is to care for your baby. Know about help for postpartum depression · \"Baby blues\" are common for the first 1 to 2 weeks after birth. You may cry or feel sad or irritable for no reason. · Rest whenever you can. Being tired makes it harder to handle your emotions. · Go for walks with your baby. · Talk to your partner, friends, and family about your feelings. · If your symptoms last for more than a few weeks, or if you feel very depressed, ask your doctor for help. · Postpartum depression can be treated. Support groups and counseling can help. Sometimes medicine can also help. Stay healthy · Eat healthy foods so you have more energy, make good breast milk, and lose extra baby pounds. · If you breastfeed, avoid alcohol and drugs. Stay smoke-free. If you quit during pregnancy, congratulations. · Start daily exercise after 4 to 6 weeks, but rest when you feel tired. · Learn exercises to tone your belly. Do Kegel exercises to regain strength in your pelvic muscles. You can do these exercises while you stand or sit. ¨ Squeeze the same muscles you would use to stop your urine. Your belly and thighs should not move. ¨ Hold the squeeze for 3 seconds, and then relax for 3 seconds. ¨ Start with 3 seconds. Then add 1 second each week until you are able to squeeze for 10 seconds. ¨ Repeat the exercise 10 to 15 times for each session. Do three or more sessions each day. · Find a class for new mothers and new babies that has an exercise time. · If you had a  birth, give yourself a bit more time before you exercise, and be careful. When should you call for help? Call 911 anytime you think you may need emergency care. For example, call if: 
· You passed out (lost consciousness). Call your doctor now or seek immediate medical care if: 
· You have severe vaginal bleeding. This means you are passing blood clots and soaking through a pad each hour for 2 or more hours. · You are dizzy or lightheaded, or you feel like you may faint. · You have a fever. · You have new belly pain, or your pain gets worse. Watch closely for changes in your health, and be sure to contact your doctor if: 
· Your vaginal bleeding seems to be getting heavier. · You have new or worse vaginal discharge. · You feel sad, anxious, or hopeless for more than a few days. · You do not get better as expected. Where can you learn more? Go to http://abran-dar.info/. Enter A461 in the search box to learn more about \"After Your Delivery (the Postpartum Period): Care Instructions. \" Current as of: May 30, 2016 Content Version: 11.1 © 8307-7104 [employer]. Care instructions adapted under license by [employer] (which disclaims liability or warranty for this information). If you have questions about a medical condition or this instruction, always ask your healthcare professional. [place] [address] any warranty or liability for your use of this information. Discharge Orders None [employer] Announcement We are excited to announce that we are making your provider's discharge notes available to you in [employer]. You will see these notes when they are completed and signed by the physician that discharged you from your recent hospital stay. If you have any questions or concerns about any information you see in [employer], please call the Health Information Department where you were seen or reach out to your Primary Care Provider for more information about your plan of care. Introducing [facility]! Dear Tom Hilario: 
Thank you for requesting a [employer] account. Our records indicate that you already have an active [employer] account. You can access your account anytime at [email or web address] Did you know that you can access your hospital and ER discharge instructions at any time in [employer]? You can also review all of your test results from your hospital stay or ER visit. Additional Information If you have questions, please visit the Frequently Asked Questions section of the [employer] website at [email or web address] Remember, [employer] is NOT to be used for urgent needs. For medical emergencies, dial 911. Now available from your iPhone and Android! General Information Please provide this summary of care documentation to your next provider. Patient Signature:  ____________________________________________________________ Date:  ____________________________________________________________  
  
Brian Naas Provider Signature:  ____________________________________________________________ Date:  ____________________________________________________________

## 2017-02-17 NOTE — PROGRESS NOTES
Labor Progress Note  Patient seen, fetal heart rate and contraction pattern evaluated, patient examined. Physical Exam:  Cervical Exam:  5 cm dilated    90% effaced    -1 station    Membranes:  Artificial Rupture of Membranes;  Amniotic Fluid: medium amount of clear fluid  Uterine Activity: Frequency: Every 3-4 minutes and Intensity: moderate    Assessment/Plan:  Reassuring fetal status, Continue plan for vaginal delivery

## 2017-02-17 NOTE — PROGRESS NOTES
Dr Craig Douglas updated on pt status and is reviewing her chart. Dr Candida Patrick updated and orders received to monitor pt for 2 hours and recheck.
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
## 2017-02-17 NOTE — PROGRESS NOTES
Dr. Shayla Arnold at [facility], [address] 5/95/-2. AROM for moderate amount of clear fluid. Orders for pitocin received.

## 2017-02-17 NOTE — PROGRESS NOTES
Dr Naseem Zamudio at nurses station, strip reviewed. Aware of SVE, maternal temp 102.1 orally, flu swab negative.   Orders received to recheck in one hour

## 2017-02-17 NOTE — PROGRESS NOTES
02/17/17 1830   Straight Cath   Straight Cath Nurse performed cath   Number of Attempts 1   Catheter Size 16 FR   Time Catheter Inserted 2983   Time Catheter Removed 1833   Urine 400 mL   Urine Assessment   Urinary Status Straight cath   Urine Color Yellow/straw   Urine Appearance Clear

## 2017-02-17 NOTE — PROGRESS NOTES
24 yo  female presents to triage with c/o contractions 3-5 min apart at home since . Denies LOF and vag bleeding. EFM on.   Dr Sehrice Brooks notified of pt's arrival.

## 2017-02-17 NOTE — DISCHARGE INSTRUCTIONS
Week 38 of Your Pregnancy: Care Instructions  Your Care Instructions    Believe it or not, your baby is almost here. You may have ideas about your baby's personality because of how much he or she moves. Or you may have noticed how he or she responds to sounds, warmth, cold, and light. You may even know what kind of music your baby likes. By now, you have a better idea of what to expect during delivery. You may have talked about your birth preferences with your doctor. But even if you want a vaginal birth, it is a good idea to learn about  births.  birth means that your baby is born through a cut (incision) in your lower belly. It is sometimes the best choice for the health of the baby and the mother. This care sheet can help you understand  births. It also gives you information about what to expect after your baby is born. And it helps you understand more about postpartum depression. Follow-up care is a key part of your treatment and safety. Be sure to make and go to all appointments, and call your doctor if you are having problems. It's also a good idea to know your test results and keep a list of the medicines you take. How can you care for yourself at home? Learn about  birth  · Most C-sections are unplanned. They are done because of problems that occur during labor. These problems might include:  ¨ Labor that slows or stops. ¨ High blood pressure or other problems for the mother. ¨ Signs of distress in the baby. These signs may include a very fast or slow heart rate. · Although most mothers and babies do well after , it is major surgery. It has more risks than a vaginal delivery. · In some cases, a planned  may be safer than a vaginal delivery. This may be the case if:  ¨ The mother has a health problem, such as a heart condition. ¨ The baby isn't in a head-down position for delivery. This is called a breech position.   ¨ The uterus has scars from past surgeries. This could increase the chance of a tear in the uterus. ¨ There is a problem with the placenta. ¨ The mother has an infection, such as genital herpes, that could be spread to the baby. ¨ The mother is having twins or more. ¨ The baby weighs 9 to 10 pounds or more. · Because of the risks of , planned C-sections generally should be done only for medical reasons. And a planned  should be done at 39 weeks or later unless there is a medical reason to do it sooner. Know what to expect after delivery, and plan for the first few weeks at home  · You, your baby, and your partner or  will get identification bands. Only people with matching bands can  the baby from the nursery. · You will learn how to feed, diaper, and bathe your baby. And you will learn how to care for the umbilical cord stump. If your baby will be circumcised, you will also learn how to care for that. · Ask people to wait to visit you until you are at home. And ask them to wash their hands before they touch your baby. · Make sure you have another adult in your home for at least 2 or 3 days after the birth. · During the first 2 weeks, limit when friends and family can visit. · Do not allow visitors who have colds or infections. Make sure all visitors are up to date with their vaccinations. Never let anyone smoke around your baby. · Try to nap when the baby naps. Be aware of postpartum depression  · \"Baby blues\" are common for the first 1 to 2 weeks after birth. You may cry or feel sad or irritable for no reason. · For some women, these feelings last longer and are more intense. This is called postpartum depression. · If your symptoms last for more than a few weeks or you feel very depressed, ask your doctor for help. · Postpartum depression can be treated. Support groups and counseling can help. Sometimes medicine can also help. Where can you learn more?   Go to http://abran-dar.info/. Enter B044 in the search box to learn more about \"Week 38 of Your Pregnancy: Care Instructions. \"  Current as of: May 30, 2016  Content Version: 11.1  © 2572-5439 [employer]. Care instructions adapted under license by [employer] (which disclaims liability or warranty for this information). If you have questions about a medical condition or this instruction, always ask your healthcare professional. [address] any warranty or liability for your use of this information. Pregnancy Precautions: Care Instructions  Your Care Instructions  There is no sure way to prevent labor before your due date ( labor) or to prevent most other pregnancy problems. But there are things you can do to increase your chances of a healthy pregnancy. Go to your appointments, follow your doctor's advice, and take good care of yourself. Eat well, and exercise (if your doctor agrees). And make sure to drink plenty of water. Follow-up care is a key part of your treatment and safety. Be sure to make and go to all appointments, and call your doctor if you are having problems. It's also a good idea to know your test results and keep a list of the medicines you take. How can you care for yourself at home? · Make sure you go to your prenatal appointments. At each visit, your doctor will check your blood pressure. Your doctor will also check to see if you have protein in your urine. High blood pressure and protein in urine are signs of preeclampsia. This condition can be dangerous for you and your baby. · Drink plenty of fluids, enough so that your urine is light yellow or clear like water. Dehydration can cause contractions. If you have kidney, heart, or liver disease and have to limit fluids, talk with your doctor before you increase the amount of fluids you drink.   · Tell your doctor right away if you notice any symptoms of an infection, such as:  ¨ Burning when you urinate. ¨ A foul-smelling discharge from your vagina. ¨ Vaginal itching. ¨ Unexplained fever. ¨ Unusual pain or soreness in your uterus or lower belly. · Eat a balanced diet. Include plenty of foods that are high in calcium and iron. ¨ Foods high in calcium include milk, cheese, yogurt, almonds, and broccoli. ¨ Foods high in iron include red meat, shellfish, poultry, eggs, beans, raisins, whole-grain bread, and leafy green vegetables. · Do not smoke. If you need help quitting, talk to your doctor about stop-smoking programs and medicines. These can increase your chances of quitting for good. · Do not drink alcohol or use illegal drugs. · Follow your doctor's directions about activity. Your doctor will let you know how much, if any, exercise you can do. · Ask your doctor if you can have sex. If you are at risk for early labor, your doctor may ask you to not have sex. · Take care to prevent falls. During pregnancy, your joints are loose, and your balance is off. Sports such as bicycling, skiing, or in-line skating can increase your risk of falling. And don't ride horses or motorcycles, dive, water ski, scuba dive, or parachute jump while you are pregnant. · Avoid getting very hot. Do not use saunas or hot tubs. Avoid staying out in the sun in hot weather for long periods. Take acetaminophen (Tylenol) to lower a high fever. · Do not take any over-the-counter or herbal medicines or supplements without talking to your doctor or pharmacist first.  When should you call for help? Call 911 anytime you think you may need emergency care. For example, call if:  · You passed out (lost consciousness). · You have severe vaginal bleeding. · You have severe pain in your belly or pelvis. · You have had fluid gushing or leaking from your vagina and you know or think the umbilical cord is bulging into your vagina.  If this happens, immediately get down on your knees so your rear end (buttocks) is higher than your head. This will decrease the pressure on the cord until help arrives. Call your doctor now or seek immediate medical care if:  · You have signs of preeclampsia, such as:  ¨ Sudden swelling of your face, hands, or feet. ¨ New vision problems (such as dimness or blurring). ¨ A severe headache. · You have any vaginal bleeding. · You have belly pain or cramping. · You have a fever. · You have had regular contractions (with or without pain) for an hour. This means that you have 8 or more within 1 hour or 4 or more in 20 minutes after you change your position and drink fluids. · You have a sudden release of fluid from your vagina. · You have low back pain or pelvic pressure that does not go away. · You notice that your baby has stopped moving or is moving much less than normal.  Watch closely for changes in your health, and be sure to contact your doctor if you have any problems. Where can you learn more? Go to http://abran-dar.info/. Enter [id number] in the search box to learn more about \"Pregnancy Precautions: Care Instructions. \"  Current as of: May 30, 2016  Content Version: 11.1  © 1396-5353 [employer]. Care instructions adapted under license by [employer] (which disclaims liability or warranty for this information). If you have questions about a medical condition or this instruction, always ask your healthcare professional. [place] [address] any warranty or liability for your use of this information.

## 2017-02-17 NOTE — PROGRESS NOTES
Dr Diego Alcantara updated. MD will see pt at 0930. Pt assisted back into the bed and EFM readjusted.

## 2017-02-17 NOTE — PROGRESS NOTES
02/17/17 1835   Cervical Exam   Dilation (cm) (anterior lip)   Eff 100 %   Station +1   Patient to high fowlers    Dr Betts Juan notified of SVE,FHT baseline 150 overall minimal variability periods of moderate, no accels noted, early decels.   Orders received to call when patient is 10 cm

## 2017-02-17 NOTE — PROGRESS NOTES
Patient states contractions are about the same. SVE 3/70/-1 by this nurse. Dr. Heather May notified. Orders received for Stadol/Phenergan IM and observe 1 hour. Patient may be discharged then if feeling better.

## 2017-02-17 NOTE — PROGRESS NOTES
02/17/17 1541   Cervical Exam   Dilation (cm) 7  (7-8)   Eff 100 %   Station 0   Dr Dennis Pierce at , strip reviewed. Temp now 103.1 orally.   SVE performed per MD.  No orders received for temp    Lab at  drawing blood cultures and CBC
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
## 2017-02-18 PROBLEM — R78.81 BACTEREMIA DUE TO GRAM-POSITIVE BACTERIA: Status: ACTIVE | Noted: 2017-02-18

## 2017-02-18 PROCEDURE — 74011250637 HC RX REV CODE- 250/637: Performed by: OBSTETRICS & GYNECOLOGY

## 2017-02-18 PROCEDURE — 65270000029 HC RM PRIVATE

## 2017-02-18 RX ORDER — ONDANSETRON 4 MG/1
4 TABLET, ORALLY DISINTEGRATING ORAL
Status: COMPLETED | OUTPATIENT
Start: 2017-02-18 | End: 2017-02-18

## 2017-02-18 RX ORDER — AMOXICILLIN AND CLAVULANATE POTASSIUM 875; 125 MG/1; MG/1
1 TABLET, FILM COATED ORAL EVERY 12 HOURS
Status: DISCONTINUED | OUTPATIENT
Start: 2017-02-18 | End: 2017-02-19

## 2017-02-18 RX ORDER — OXYCODONE HYDROCHLORIDE 5 MG/1
5 TABLET ORAL
Status: DISCONTINUED | OUTPATIENT
Start: 2017-02-18 | End: 2017-02-21 | Stop reason: HOSPADM

## 2017-02-18 RX ADMIN — OXYCODONE HYDROCHLORIDE 5 MG: 5 TABLET ORAL at 21:19

## 2017-02-18 RX ADMIN — ONDANSETRON 4 MG: 4 TABLET, ORALLY DISINTEGRATING ORAL at 18:37

## 2017-02-18 RX ADMIN — HYDROCODONE BITARTRATE AND ACETAMINOPHEN 1 TABLET: 5; 325 TABLET ORAL at 09:19

## 2017-02-18 RX ADMIN — AMOXICILLIN AND CLAVULANATE POTASSIUM 1 TABLET: 875; 125 TABLET, FILM COATED ORAL at 11:47

## 2017-02-18 RX ADMIN — OXYCODONE HYDROCHLORIDE 5 MG: 5 TABLET ORAL at 14:41

## 2017-02-18 RX ADMIN — ACYCLOVIR 400 MG: 200 CAPSULE ORAL at 09:00

## 2017-02-18 RX ADMIN — IBUPROFEN 800 MG: 800 TABLET, FILM COATED ORAL at 09:19

## 2017-02-18 RX ADMIN — IBUPROFEN 800 MG: 800 TABLET, FILM COATED ORAL at 03:14

## 2017-02-18 RX ADMIN — AMOXICILLIN AND CLAVULANATE POTASSIUM 1 TABLET: 875; 125 TABLET, FILM COATED ORAL at 23:00

## 2017-02-18 RX ADMIN — HYDROCODONE BITARTRATE AND ACETAMINOPHEN 1 TABLET: 5; 325 TABLET ORAL at 00:03

## 2017-02-18 RX ADMIN — HYDROCODONE BITARTRATE AND ACETAMINOPHEN 1 TABLET: 5; 325 TABLET ORAL at 04:08

## 2017-02-18 RX ADMIN — PRENATAL VIT W/ FE FUMARATE-FA TAB 27-0.8 MG 1 TABLET: 27-0.8 TAB at 09:19

## 2017-02-18 NOTE — PROGRESS NOTES
Report received from Lilli Alegria, BLANKA, and care assumed. Bedside report completed. Pt denies any needs at present.

## 2017-02-18 NOTE — ANESTHESIA POSTPROCEDURE EVALUATION
Post-Anesthesia Evaluation and Assessment    Patient: Araceli Vallecillo MRN: [id number]  SSN: [id number]    YOB: 1995  Age: 25 y.o. Sex: female       Cardiovascular Function/Vital Signs  Visit Vitals    /56 (BP 1 Location: Right arm, BP Patient Position: At rest)    Pulse 77    Temp 36.7 °C (98.1 °F)    Resp 18    Breastfeeding Unknown       Patient is status post epidural anesthesia for labor and vaginal delivery. Nausea/Vomiting: None    Postoperative hydration reviewed and adequate. Pain:  Pain Scale 1: Numeric (0 - 10) (02/18/17 0130)  Pain Intensity 1: 2 (02/18/17 0130)   Managed    Neurological Status:   Neuro (WDL): Within Defined Limits (02/17/17 2330)  Neuro  LLE Motor Response: Purposeful;Numbness (02/17/17 2129)  RLE Motor Response: Numbness; Purposeful (02/17/17 2129)   At baseline    Mental Status and Level of Consciousness: Arousable    Pulmonary Status:   O2 Device: Room air (02/18/17 0130)   Adequate oxygenation and airway patent    Complications related to anesthesia: None    Post-anesthesia assessment completed. No concerns. The patient was satisfied with her labor epidural and denies any complications. Her lower extremities have returned to baseline neurologically.     Signed By: Alyssa Simmons MD     February 18, 2017

## 2017-02-18 NOTE — PROGRESS NOTES
SBAR OUT Report: Mother    Verbal report given to BLANKA Sanchez on this patient, who is now being transferred to Mother/infant (unit) for routine progression of care. The patient is not wearing a green \"Anesthesia-Duramorph\" band. Report consisted of patient's Situation, Background, Assessment and Recommendations (SBAR).  ID bands were compared with the identification form, and verified with the patient and receiving nurse. Information from the SBAR, Procedure Summary, Intake/Output and MAR and the [place] Report was reviewed with the receiving nurse; opportunity for questions and clarification provided.

## 2017-02-18 NOTE — PROGRESS NOTES
Patient walked around the unit. Back in the room and states she feels hot. Temperature orally = 98.4. Educated the patient on new orders from Dr Sari Torres. She verbalized she understands.

## 2017-02-18 NOTE — PROGRESS NOTES
Lab informed the floor nurse that the blood cultures obtained yesterday during labor are positive for gram+ bacteria. Patient clinically does not show any signs of septicemia.

## 2017-02-18 NOTE — PROGRESS NOTES
Dr. Dennis Pierce called and informed of patients preliminary blood culture result (Gram pos cocci). Orders received for Q4 vitals, Augmentin BID, DC norco and start oxycodone 5 mg. Orders placed in [facility] care and primary nurse notified.

## 2017-02-18 NOTE — PROGRESS NOTES
Post-Partum Day Number 1 Progress Note    Patient doing well post-partum without significant complaint. Voiding withour difficulty, normal lochia. Vitals:  Patient Vitals for the past 8 hrs:   BP Temp Pulse Resp   17 0746 107/56 98.1 °F (36.7 °C) 77 18     Temp (24hrs), Av.1 °F (37.8 °C), Min:98.1 °F (36.7 °C), Max:103.1 °F (39.5 °C)      Vital signs stable, afebrile. Exam:  Patient without distress. Abdomen soft, fundus firm at below level of umbilicus, nontender               Perineum with normal lochia noted. Lower extremities are negative for swelling, cords or tenderness. Assessment and Plan:  Patient appears to be having uncomplicated post-partum course. Continue routine perineal care and maternal education. Plan discharge tomorrow if no problems occur.

## 2017-02-18 NOTE — ROUTINE PROCESS
SBAR IN Report: Mother    Verbal report received from [facility], RN on this patient, who is now being transferred from [facility] for routine progression of care. The patient is not wearing a green \"Anesthesia-Duramorph\" band. Report consisted of patient's Situation, Background, Assessment and Recommendations (SBAR). [place] ID bands were compared with the identification form, and verified with the patient and transferring nurse. Information from the Procedure Summary and the [place] Report was reviewed with the transferring nurse; opportunity for questions and clarification provided.

## 2017-02-18 NOTE — PROGRESS NOTES
Patient states she has been \"throwing up\" since she delivered infant last night. RN has had the patient all day and she ate breakfast, lunch, and snacks without any nausea or event. Dr Dylon Vines called and a one time dose of Zofran 4mg disolvable tablet ordered. Also to discontinue the Acyclovir. Read orders back. Verbalized understanding.

## 2017-02-18 NOTE — PROGRESS NOTES
IV discontinued with catheter tip intact. No signs of redness, heat, swelling, or pain noted. The patient tolerated well.

## 2017-02-19 LAB
BASOPHILS # BLD AUTO: 0 K/UL (ref 0–0.2)
BASOPHILS # BLD: 0 % (ref 0–2)
CREAT SERPL-MCNC: 0.85 MG/DL (ref 0.6–1)
DIFFERENTIAL METHOD BLD: ABNORMAL
EOSINOPHIL # BLD: 0.1 K/UL (ref 0–0.8)
EOSINOPHIL NFR BLD: 0 % (ref 0.5–7.8)
ERYTHROCYTE [DISTWIDTH] IN BLOOD BY AUTOMATED COUNT: 14.1 % (ref 11.9–14.6)
HCT VFR BLD AUTO: 32.1 % (ref 35.8–46.3)
HGB BLD-MCNC: 11 G/DL (ref 11.7–15.4)
IMM GRANULOCYTES # BLD: 0.3 K/UL (ref 0–0.5)
IMM GRANULOCYTES NFR BLD AUTO: 1.1 % (ref 0–5)
LACTATE SERPL-SCNC: 1 MMOL/L (ref 0.4–2)
LYMPHOCYTES # BLD AUTO: 12 % (ref 13–44)
LYMPHOCYTES # BLD: 2.9 K/UL (ref 0.5–4.6)
MCH RBC QN AUTO: 31.9 PG (ref 26.1–32.9)
MCHC RBC AUTO-ENTMCNC: 34.3 G/DL (ref 31.4–35)
MCV RBC AUTO: 93 FL (ref 79.6–97.8)
MONOCYTES # BLD: 1.1 K/UL (ref 0.1–1.3)
MONOCYTES NFR BLD AUTO: 5 % (ref 4–12)
NEUTS SEG # BLD: 19.9 K/UL (ref 1.7–8.2)
NEUTS SEG NFR BLD AUTO: 82 % (ref 43–78)
PLATELET # BLD AUTO: 93 K/UL (ref 150–450)
PMV BLD AUTO: 11.3 FL (ref 10.8–14.1)
RBC # BLD AUTO: 3.45 M/UL (ref 4.05–5.25)
WBC # BLD AUTO: 24.3 K/UL (ref 4.3–11.1)

## 2017-02-19 PROCEDURE — 74011250636 HC RX REV CODE- 250/636: Performed by: OBSTETRICS & GYNECOLOGY

## 2017-02-19 PROCEDURE — 74011000258 HC RX REV CODE- 258: Performed by: OBSTETRICS & GYNECOLOGY

## 2017-02-19 PROCEDURE — 65270000029 HC RM PRIVATE

## 2017-02-19 PROCEDURE — 74011250637 HC RX REV CODE- 250/637: Performed by: OBSTETRICS & GYNECOLOGY

## 2017-02-19 PROCEDURE — 36415 COLL VENOUS BLD VENIPUNCTURE: CPT | Performed by: OBSTETRICS & GYNECOLOGY

## 2017-02-19 PROCEDURE — 83605 ASSAY OF LACTIC ACID: CPT | Performed by: OBSTETRICS & GYNECOLOGY

## 2017-02-19 PROCEDURE — 82565 ASSAY OF CREATININE: CPT | Performed by: OBSTETRICS & GYNECOLOGY

## 2017-02-19 PROCEDURE — 85025 COMPLETE CBC W/AUTO DIFF WBC: CPT | Performed by: OBSTETRICS & GYNECOLOGY

## 2017-02-19 RX ORDER — CITALOPRAM 10 MG/1
20 TABLET ORAL DAILY
Status: DISCONTINUED | OUTPATIENT
Start: 2017-02-20 | End: 2017-02-19

## 2017-02-19 RX ORDER — ZOLPIDEM TARTRATE 5 MG/1
TABLET ORAL
Status: ACTIVE
Start: 2017-02-19 | End: 2017-02-19

## 2017-02-19 RX ORDER — CITALOPRAM 10 MG/1
20 TABLET ORAL DAILY
Status: DISCONTINUED | OUTPATIENT
Start: 2017-02-19 | End: 2017-02-21 | Stop reason: HOSPADM

## 2017-02-19 RX ORDER — DEXTROSE MONOHYDRATE AND SODIUM CHLORIDE 5; .45 G/100ML; G/100ML
50 INJECTION, SOLUTION INTRAVENOUS CONTINUOUS
Status: DISCONTINUED | OUTPATIENT
Start: 2017-02-19 | End: 2017-02-19

## 2017-02-19 RX ORDER — ZOLPIDEM TARTRATE 5 MG/1
5 TABLET ORAL
Status: DISCONTINUED | OUTPATIENT
Start: 2017-02-19 | End: 2017-02-21 | Stop reason: HOSPADM

## 2017-02-19 RX ADMIN — PRENATAL VIT W/ FE FUMARATE-FA TAB 27-0.8 MG 1 TABLET: 27-0.8 TAB at 08:50

## 2017-02-19 RX ADMIN — TOBRAMYCIN SULFATE 80 MG: 40 INJECTION, SOLUTION INTRAMUSCULAR; INTRAVENOUS at 02:37

## 2017-02-19 RX ADMIN — CITALOPRAM HYDROBROMIDE 20 MG: 10 TABLET ORAL at 10:26

## 2017-02-19 RX ADMIN — TOBRAMYCIN SULFATE 80 MG: 40 INJECTION, SOLUTION INTRAMUSCULAR; INTRAVENOUS at 18:21

## 2017-02-19 RX ADMIN — ZOLPIDEM TARTRATE 5 MG: 5 TABLET, FILM COATED ORAL at 01:35

## 2017-02-19 RX ADMIN — AMPICILLIN SODIUM AND SULBACTAM SODIUM 3 G: 2; 1 INJECTION, POWDER, FOR SOLUTION INTRAMUSCULAR; INTRAVENOUS at 01:36

## 2017-02-19 RX ADMIN — TOBRAMYCIN SULFATE 80 MG: 40 INJECTION, SOLUTION INTRAMUSCULAR; INTRAVENOUS at 10:27

## 2017-02-19 RX ADMIN — AMPICILLIN SODIUM AND SULBACTAM SODIUM 3 G: 2; 1 INJECTION, POWDER, FOR SOLUTION INTRAMUSCULAR; INTRAVENOUS at 20:15

## 2017-02-19 RX ADMIN — OXYCODONE HYDROCHLORIDE 5 MG: 5 TABLET ORAL at 01:35

## 2017-02-19 RX ADMIN — AMPICILLIN SODIUM AND SULBACTAM SODIUM 3 G: 2; 1 INJECTION, POWDER, FOR SOLUTION INTRAMUSCULAR; INTRAVENOUS at 14:18

## 2017-02-19 RX ADMIN — DEXTROSE MONOHYDRATE AND SODIUM CHLORIDE 50 ML/HR: 5; .45 INJECTION, SOLUTION INTRAVENOUS at 01:36

## 2017-02-19 RX ADMIN — OXYCODONE HYDROCHLORIDE 5 MG: 5 TABLET ORAL at 08:50

## 2017-02-19 RX ADMIN — AMPICILLIN SODIUM AND SULBACTAM SODIUM 3 G: 2; 1 INJECTION, POWDER, FOR SOLUTION INTRAMUSCULAR; INTRAVENOUS at 08:08

## 2017-02-19 NOTE — PROGRESS NOTES
Pharmacokinetic Consult to Pharmacist    Lj Jaquez is a 25 y.o. female being treated for bacteremia with Unasyn and tobramycin. Weight: 74.4 kg (164 lb)  Lab Results   Component Value Date/Time    Creatinine 0.85 02/19/2017 07:03 AM    WBC 24.3 02/19/2017 07:03 AM      Estimated Creatinine Clearance: 95.7 mL/min (based on Cr of 0.85). CULTURES:  Blood - Group A strep  Urine - NG x 1day      Day 1 of therapy. Continue current dose of Tobramycin 80 mg IV every 8 hours. Trough and peak levels scheduled around 0200 dose. Trough at 0130 and peak at 0300 (30 minutes after completion of infusion). Pharmacy will continue to follow patient and make dose adjustments as necessary.       Thank you,    Anai Paulson, PharmD

## 2017-02-19 NOTE — PROGRESS NOTES
Notified by Victoriano Aase, RN that pt not feeling well during time in SCN. Pt temp taken orally 100.3. Dr. Flaca Johnson notified. Orders received to restart IV and begin IV antibiotics. Pt verbalizes understanding. Instructed to call for needs or concerns.

## 2017-02-19 NOTE — PROGRESS NOTES
Patient had a walnut size clot while in bathroom, lochia is scant on pad with clot, will report to oncoming shift to monitor

## 2017-02-19 NOTE — PROGRESS NOTES
Pt requesting something for sleep. States she \"feels overwhelmed and just wants to rest.\" Dr. Jen Herrera called. Orders for 5 mg ambien QHS PRN and orders to add Celexa 20 mg Qdaily for tomorrow.

## 2017-02-19 NOTE — PROGRESS NOTES
Post-Partum Progress Note    Patient doing well post-partum without significant complaint. Voiding withour difficulty, normal lochia. Vitals:  Patient Vitals for the past 8 hrs:   BP Temp Pulse Resp   17 1930 120/79 98.9 °F (37.2 °C) 90 16     Temp (24hrs), Av.4 °F (36.9 °C), Min:98.1 °F (36.7 °C), Max:99.1 °F (37.3 °C)      Vital signs stable, afebrile. Exam:  Patient without distress. Abdomen soft, fundus firm at level of umbilicus, nontender               Perineum with normal lochia noted. Lower extremities are negative for swelling, cords or tenderness. Assessment and Plan:  Patient clinically appears to be without any signs of septicemia. She is on PO antibiotic as per her request. Will continue to monitor for signs of infection.

## 2017-02-19 NOTE — LACTATION NOTE
In to see mom and infant in the nursery. Mom had infant latched on left breast in cross cradle hold. Infant was latched but was not sucking. Recommended that mom start pumping and offer breast when infant is awake and cueing. Bedside nurse went to nursery and started mom pumping.  Lactation consultant will follow up in am.

## 2017-02-19 NOTE — PROGRESS NOTES
Into patients room to wake pt up for infants next feeding. Pt states \"I'm too sleepy\" and requests SCN to feed infant formula again for this feed.

## 2017-02-19 NOTE — LACTATION NOTE

## 2017-02-19 NOTE — PROGRESS NOTES
Post-Partum Day Number 2 Progress/Discharge Note    Patient doing well post-partum without significant complaint. Voiding without difficulty, normal lochia, positive flatus. Vitals:  Patient Vitals for the past 8 hrs:   BP Temp Pulse Resp   17 0748 109/74 98.1 °F (36.7 °C) 82 18   17 0431 119/69 98.7 °F (37.1 °C) 73 16     Temp (24hrs), Av.9 °F (37.2 °C), Min:98.1 °F (36.7 °C), Max:100.3 °F (37.9 °C)      Vital signs stable, afebrile. Exam:  Patient without distress. Abdomen soft, fundus firm at level of umbilicus, non tender               Perineum with normal lochia noted. Lower extremities are negative for swelling, cords or tenderness. Lab/Data Review: All lab results for the last 24 hours reviewed. Assessment and Plan:  Patient appears to be in good health despite the positive blood cultures. Will continue on IV antibiotics for now.

## 2017-02-19 NOTE — PROGRESS NOTES
Report received from Chavo Last, RN, and care assumed. Bedside report completed. Pt denies any needs at present.

## 2017-02-19 NOTE — PROGRESS NOTES
Care Management Interventions  Transition of Care Consult (CM Consult): Other  Current Support Network: Relative's Home  Discharge Location  Discharge Placement: Home with family assistance   25 y.o. female who gave birth to baby girl on 2/17. Babys name is Rohini Saldivar. Eric Che is single. She lives with her mother at the address in the chart. She reports her mother is extremely supportive. Has all needs. Insurance is [employer].

## 2017-02-19 NOTE — PROGRESS NOTES
IV discontinued with catheter tip intact. No signs of redness, heat, swelling, or pain noted. The patient tolerated well. Patient getting ready to go downstairs to smoke.

## 2017-02-19 NOTE — PROGRESS NOTES
Patient requesting to go downstairs to smoke. Dr Dennis Pierce called and said the patient can go downstairs to smoke without IV access. The patients IV has infiltrated so the patient can go downstairs before we start her new IV.

## 2017-02-19 NOTE — LACTATION NOTE
Spoke to patient's bedside nurse. She has been started on IV antibiotics and will not discharge to home. Mom as been going down to nursery this am and breast feeding infant. Nurse stated that she heard gulping and swallows.  Mom plans to pump after feeds and when infant not going to breast. Lactation consultant will follow up in am.

## 2017-02-19 NOTE — PROGRESS NOTES
Pt states she would really like to sleep the rest of the night. Requests that this RN inform SCN okay to give infant formula for tonight. Encouraged mom to wake up by 6 am to pump. Pt verbalizes understanding. SCN aware.

## 2017-02-20 LAB
ANION GAP BLD CALC-SCNC: 10 MMOL/L (ref 7–16)
BACTERIA SPEC CULT: NORMAL
BUN SERPL-MCNC: 7 MG/DL (ref 6–23)
CALCIUM SERPL-MCNC: 7.8 MG/DL (ref 8.3–10.4)
CHLORIDE SERPL-SCNC: 106 MMOL/L (ref 98–107)
CO2 SERPL-SCNC: 25 MMOL/L (ref 21–32)
CREAT SERPL-MCNC: 0.83 MG/DL (ref 0.6–1)
DIFFERENTIAL METHOD BLD: ABNORMAL
EOSINOPHIL # BLD: 0.1 K/UL (ref 0–0.8)
EOSINOPHIL NFR BLD MANUAL: 1 % (ref 1–8)
ERYTHROCYTE [DISTWIDTH] IN BLOOD BY AUTOMATED COUNT: 13.8 % (ref 11.9–14.6)
GLUCOSE SERPL-MCNC: 106 MG/DL (ref 65–100)
HCT VFR BLD AUTO: 31.3 % (ref 35.8–46.3)
HGB BLD-MCNC: 10.8 G/DL (ref 11.7–15.4)
LYMPHOCYTES # BLD: 3 K/UL (ref 0.5–4.6)
LYMPHOCYTES NFR BLD MANUAL: 21 % (ref 16–44)
MCH RBC QN AUTO: 31.9 PG (ref 26.1–32.9)
MCHC RBC AUTO-ENTMCNC: 34.5 G/DL (ref 31.4–35)
MCV RBC AUTO: 92.3 FL (ref 79.6–97.8)
MONOCYTES # BLD: 1.2 K/UL (ref 0.1–1.3)
MONOCYTES NFR BLD MANUAL: 8 % (ref 3–9)
NEUTS SEG # BLD: 10.1 K/UL (ref 1.7–8.2)
NEUTS SEG NFR BLD MANUAL: 70 % (ref 47–75)
PLATELET # BLD AUTO: 109 K/UL (ref 150–450)
PLATELET COMMENTS,PCOM: ADEQUATE
PMV BLD AUTO: 11.2 FL (ref 10.8–14.1)
POTASSIUM SERPL-SCNC: 3.4 MMOL/L (ref 3.5–5.1)
RBC # BLD AUTO: 3.39 M/UL (ref 4.05–5.25)
RBC MORPH BLD: ABNORMAL
SERVICE CMNT-IMP: NORMAL
SODIUM SERPL-SCNC: 141 MMOL/L (ref 136–145)
TOBRAMYCIN PEAK SERPL-MCNC: 3.7 UG/ML (ref 4–8)
TOBRAMYCIN TROUGH SERPL-MCNC: 0.6 UG/ML (ref 1–2)
WBC # BLD AUTO: 14.4 K/UL (ref 4.3–11.1)

## 2017-02-20 PROCEDURE — 74011000258 HC RX REV CODE- 258: Performed by: OBSTETRICS & GYNECOLOGY

## 2017-02-20 PROCEDURE — 74011250636 HC RX REV CODE- 250/636: Performed by: OBSTETRICS & GYNECOLOGY

## 2017-02-20 PROCEDURE — 85025 COMPLETE CBC W/AUTO DIFF WBC: CPT | Performed by: OBSTETRICS & GYNECOLOGY

## 2017-02-20 PROCEDURE — 65270000029 HC RM PRIVATE

## 2017-02-20 PROCEDURE — 74011250637 HC RX REV CODE- 250/637: Performed by: OBSTETRICS & GYNECOLOGY

## 2017-02-20 PROCEDURE — 80200 ASSAY OF TOBRAMYCIN: CPT | Performed by: OBSTETRICS & GYNECOLOGY

## 2017-02-20 PROCEDURE — 80048 BASIC METABOLIC PNL TOTAL CA: CPT | Performed by: OBSTETRICS & GYNECOLOGY

## 2017-02-20 RX ADMIN — AMPICILLIN SODIUM AND SULBACTAM SODIUM 3 G: 2; 1 INJECTION, POWDER, FOR SOLUTION INTRAMUSCULAR; INTRAVENOUS at 16:25

## 2017-02-20 RX ADMIN — TOBRAMYCIN SULFATE 80 MG: 40 INJECTION, SOLUTION INTRAMUSCULAR; INTRAVENOUS at 01:57

## 2017-02-20 RX ADMIN — AMPICILLIN SODIUM AND SULBACTAM SODIUM 3 G: 2; 1 INJECTION, POWDER, FOR SOLUTION INTRAMUSCULAR; INTRAVENOUS at 22:19

## 2017-02-20 RX ADMIN — AMPICILLIN SODIUM AND SULBACTAM SODIUM 3 G: 2; 1 INJECTION, POWDER, FOR SOLUTION INTRAMUSCULAR; INTRAVENOUS at 02:51

## 2017-02-20 RX ADMIN — OXYCODONE HYDROCHLORIDE 5 MG: 5 TABLET ORAL at 00:30

## 2017-02-20 RX ADMIN — OXYCODONE HYDROCHLORIDE 5 MG: 5 TABLET ORAL at 23:20

## 2017-02-20 RX ADMIN — TOBRAMYCIN SULFATE 80 MG: 40 INJECTION, SOLUTION INTRAMUSCULAR; INTRAVENOUS at 11:42

## 2017-02-20 RX ADMIN — CITALOPRAM HYDROBROMIDE 20 MG: 10 TABLET ORAL at 09:43

## 2017-02-20 RX ADMIN — OXYCODONE HYDROCHLORIDE 5 MG: 5 TABLET ORAL at 16:25

## 2017-02-20 RX ADMIN — TOBRAMYCIN SULFATE 80 MG: 40 INJECTION, SOLUTION INTRAMUSCULAR; INTRAVENOUS at 19:50

## 2017-02-20 RX ADMIN — AMPICILLIN SODIUM AND SULBACTAM SODIUM 3 G: 2; 1 INJECTION, POWDER, FOR SOLUTION INTRAMUSCULAR; INTRAVENOUS at 09:53

## 2017-02-20 RX ADMIN — PRENATAL VIT W/ FE FUMARATE-FA TAB 27-0.8 MG 1 TABLET: 27-0.8 TAB at 09:43

## 2017-02-20 NOTE — PROGRESS NOTES
SW consult received due to history of anxiety/depression.  met with patient at bedside in NICU. Baby's name is Adzerk. Patient confirms a history of depression and anxiety since she was 15years old. Patient initially took Prozac until she was 26 y/o and then switched to Celexa. She elected to not take her Celexa during pregnancy, but has now resumed this medication. Patient reports that Celexa manages her depression/anxiety very well. Patient states that her support system consists of her mother whom she lives with and Fidel's father. Patient plans to breastfeed and is currently enrolled in [employer] . Patient has car seat and all needed items to care for baby Maeve Brewer.  provided education and literature on support available thru [employer] ([employer]). [employer] Warmline:  7-299-555-4PPD (2416). WWW. POSTPARTUM. NET    Family was informed of signs/symptoms, forms of intervention (medication, counseling, education), and resources (local coordinators available telephonically, monthly support group in [facility] with expert\" phone sessions). Discussed importance of self-care and accepting help when offered. Family was encouraged to contact me with any questions/needs -  contact information provided.  provided education/pamphlet on [facility] Postpartum [facility] Home Visit. Family would like to receive home visit. Referral will be made at discharge.     Sara Jean, [address] [facility]

## 2017-02-20 NOTE — PROGRESS NOTES
Patient is S/P vaginal delivery at 38 2/7 weeks. No complaints today. Lochia < menses. No GI/ issues. No F/C, CP, SOB. Minimal LBP  Pt with fever of 103 intrapartum, none since. CV - RRR  LUNGS - CTA bilaterally  ABD - soft, approp tend, FF below umbilicus  EXT - tr edema bilaterally        Stable. Pt is breast feeding           PPD #3   - Routine PP care. Cont IV Abx until susceptibilties return. Close obs temp curve.      Diandra Stringer MD  9:09 AM  17

## 2017-02-20 NOTE — LACTATION NOTE
This note was copied from a baby's chart. Back to mom's room to show her personal pump demo per request. Also completed suction test- WNL. Mom has no further needs at this time.

## 2017-02-20 NOTE — PROGRESS NOTES
Patient states she did not measure her urine and has no idea how many times she has voided tonight. She also didn't write down her intake.

## 2017-02-20 NOTE — LACTATION NOTE
This note was copied from a baby's chart. Back to [facility] to assist mom with feeding. She states infant has been latching and feed pretty well at breast. Encouraged her to continue to pump after any failed or poor/fair feeding attempts for 15-20 minutes and give back any colostrum(saves for following feed). She follows up with bottle of supplementation as well. Mom has been pumping around one ounce today each time- transitional milk noted in bottle. Upon assessment of mom's breasts, both nipples had scabbed compression stripes. Mom reports does not hurt her very much during feeding. Discussed how to clean nipples and apply lanolin or coconut oil to help heal and prevent infection. Showed her how to do cross cradle position on left breast and football on right breast to help guide her to deeper latch and prevent further compression damage. She stated this felt much better. Many audible swallows noted during feed. Infant fed for 3-5 min on L breast and 10 minutes on R breast before becoming very sleepy and disinterested. Mom to follow up with pumping and supplementation until infant consistently feeding strong at breast. Did review with her how to manage period of engorgement as her milk is coming in today. Will meet with mom later today to help do suction test and demo of personal pump.

## 2017-02-20 NOTE — PROGRESS NOTES
Pharmacokinetic Consult to Pharmacist    Radhika Dorina is a 25 y.o. female being treated for possible bacteremia with Unasyn and Tobramycin. Weight: 74.4 kg (164 lb)  Lab Results   Component Value Date/Time    BUN 7 02/20/2017 03:10 AM    Creatinine 0.83 02/20/2017 03:10 AM    WBC 14.4 02/20/2017 03:10 AM      Estimated Creatinine Clearance: 98 mL/min (based on Cr of 0.83). CULTURES:  All Micro Results     Procedure Component Value Units Date/Time    CULTURE, URINE [[id number]] Collected:  02/17/17 1545    Order Status:  Completed Specimen:  Urine from [place] Specimen Updated:  02/20/17 0738     Special Requests: NO SPECIAL REQUESTS        Culture result: NO GROWTH 2 DAYS       CULTURE, BLOOD [[id number]]  (Abnormal) Collected:  02/17/17 1538    Order Status:  Completed Specimen:  Blood from Blood Updated:  02/20/17 0720     Special Requests: LEFT ARM        GRAM STAIN GRAM POSITIVE COCCI  AEROBIC AND ANAEROBIC BOTTLES         RESULTS VERIFIED, PHONED TO AND READ BACK BY  Jasson Cortez RN [id number]        Culture result: ALPHA STREPTOCOCCUS  REPEATING ID AND SUSCEPTIBILITY   (A)     CULTURE, BLOOD [[id number]]  (Abnormal) Collected:  02/17/17 1546    Order Status:  Completed Specimen:  Blood from Blood Updated:  02/19/17 0705     Special Requests: LEFT ARM        GRAM STAIN GRAM POSITIVE COCCI  AEROBIC AND ANAEROBIC BOTTLES         RESULTS VERIFIED, PHONED TO AND READ BACK BY  PATSY NEWELL RN [id number]        Culture result: ALPHA STREPTOCOCCUS (A)                 CULTURE IN PROGRESS,FURTHER UPDATES TO FOLLOW    INFLUENZA A & B AG (RAPID TEST) [[id number]] Collected:  02/17/17 1611    Order Status:  Completed Specimen:  Nasopharyngeal from Nasal washing Updated:  02/17/17 1634     Influenza A Ag NEGATIVE          NEGATIVE FOR THE PRESENCE OF INFLUENZA A ANTIGEN  INFECTION DUE TO INFLUENZA A CANNOT BE RULED OUT.   BECAUSE THE ANTIGEN PRESENT IN THE SAMPLE MAY BE BELOW  THE DETECTION LIMIT OF THE TEST. A NEGATIVE TEST IS PRESUMPTIVE AND IT IS RECOMMENDED THAT THESE RESULTS BE CONFIRMED BY VIRAL CULTURE OR AN FDA-CLEARED INFLUENZA A AND B MOLECULAR ASSAY. Influenza B Ag NEGATIVE          NEGATIVE FOR THE PRESENCE OF INFLUENZA B ANTIGEN  INFECTION DUE TO INFLUENZA B CANNOT BE RULED OUT. BECAUSE THE ANTIGEN PRESENT IN THE SAMPLE MAY BE BELOW  THE DETECTION LIMIT OF THE TEST. A NEGATIVE TEST IS PRESUMPTIVE AND IT IS RECOMMENDED THAT THESE RESULTS BE CONFIRMED BY VIRAL CULTURE OR AN FDA-CLEARED INFLUENZA A AND B MOLECULAR ASSAY. MRSA SCREEN - PCR (NASAL) [[id number]] Collected:  02/17/17 1052    Order Status:  Completed Specimen:  Nasal from Nasal Updated:  02/17/17 1219     Special Requests: NO SPECIAL REQUESTS        Culture result:         MRSA target DNA is not detected (presumptive not colonized with MRSA)            Lab Results   Component Value Date/Time    Tobramycin,peak 3.7 02/20/2017 03:10 AM    Tobramycin trough 0.6 02/20/2017 01:15 AM       Day 2 of therapy. Will continue with Tobramycin 80 mg IV every 8 hours. Will continue to follow patient.       Thank you,  Thien MckeonD, BCPS

## 2017-02-21 VITALS
RESPIRATION RATE: 18 BRPM | DIASTOLIC BLOOD PRESSURE: 81 MMHG | HEART RATE: 62 BPM | BODY MASS INDEX: 30.99 KG/M2 | WEIGHT: 164 LBS | SYSTOLIC BLOOD PRESSURE: 114 MMHG | TEMPERATURE: 98.3 F

## 2017-02-21 PROCEDURE — 74011000258 HC RX REV CODE- 258: Performed by: OBSTETRICS & GYNECOLOGY

## 2017-02-21 PROCEDURE — 74011250637 HC RX REV CODE- 250/637: Performed by: OBSTETRICS & GYNECOLOGY

## 2017-02-21 PROCEDURE — 74011250636 HC RX REV CODE- 250/636: Performed by: OBSTETRICS & GYNECOLOGY

## 2017-02-21 RX ORDER — IBUPROFEN 600 MG/1
600 TABLET ORAL
Qty: 60 TAB | Refills: 2 | Status: SHIPPED | OUTPATIENT
Start: 2017-02-21 | End: 2017-04-03

## 2017-02-21 RX ORDER — DOCUSATE SODIUM 100 MG/1
100 CAPSULE, LIQUID FILLED ORAL 2 TIMES DAILY
Status: DISCONTINUED | OUTPATIENT
Start: 2017-02-21 | End: 2017-02-21 | Stop reason: HOSPADM

## 2017-02-21 RX ORDER — CITALOPRAM 20 MG/1
20 TABLET, FILM COATED ORAL DAILY
Qty: 30 TAB | Refills: 2 | Status: SHIPPED | OUTPATIENT
Start: 2017-02-21 | End: 2020-12-29

## 2017-02-21 RX ORDER — OXYCODONE HYDROCHLORIDE 5 MG/1
5 TABLET ORAL
Qty: 24 TAB | Refills: 0 | Status: SHIPPED | OUTPATIENT
Start: 2017-02-21 | End: 2017-04-03

## 2017-02-21 RX ORDER — ACETAMINOPHEN AND CODEINE PHOSPHATE 120; 12 MG/5ML; MG/5ML
1 SOLUTION ORAL DAILY
Qty: 1 PACKAGE | Refills: 12 | Status: SHIPPED | OUTPATIENT
Start: 2017-02-21 | End: 2017-04-03

## 2017-02-21 RX ADMIN — TOBRAMYCIN SULFATE 80 MG: 40 INJECTION, SOLUTION INTRAMUSCULAR; INTRAVENOUS at 11:25

## 2017-02-21 RX ADMIN — OXYCODONE HYDROCHLORIDE 5 MG: 5 TABLET ORAL at 09:04

## 2017-02-21 RX ADMIN — OXYCODONE HYDROCHLORIDE 5 MG: 5 TABLET ORAL at 03:42

## 2017-02-21 RX ADMIN — CITALOPRAM HYDROBROMIDE 20 MG: 10 TABLET ORAL at 09:04

## 2017-02-21 RX ADMIN — PRENATAL VIT W/ FE FUMARATE-FA TAB 27-0.8 MG 1 TABLET: 27-0.8 TAB at 09:04

## 2017-02-21 RX ADMIN — AMPICILLIN SODIUM AND SULBACTAM SODIUM 3 G: 2; 1 INJECTION, POWDER, FOR SOLUTION INTRAMUSCULAR; INTRAVENOUS at 10:27

## 2017-02-21 RX ADMIN — AMPICILLIN SODIUM AND SULBACTAM SODIUM 3 G: 2; 1 INJECTION, POWDER, FOR SOLUTION INTRAMUSCULAR; INTRAVENOUS at 04:37

## 2017-02-21 RX ADMIN — TOBRAMYCIN SULFATE 80 MG: 40 INJECTION, SOLUTION INTRAMUSCULAR; INTRAVENOUS at 03:33

## 2017-02-21 NOTE — ADT AUTH CERT NOTES
Obstetric and Gynecologic Disease GRG - Care Day 4 (2017) by Haja Noyola RN        Review Entered Review Status       2017 Completed       Details              Care Day: 4 Care Date: 2017 Level of Care: Inpatient Floor       Guideline Day 2        Level Of Care       (X) Floor              Clinical Status       (X) * No ICU or intermediate care needs              Interventions       (X) Inpatient interventions continue       (X) Transition to oral routes              2017 3:21 PM EST by Frances Petersen       Subject: Additional Clinical Information        labs wbc 14.4 hg 10.8 k 3.4 vs 98.1 hr 69 18 109.79 meds iv ampicillin, iv tobramycin,                                   * Milestone              Additional Notes        progress note       Patient is S/P vaginal delivery at 38 2/7 weeks. No complaints today.       Lochia < menses. No GI/ issues. No F/C, CP, SOB. Minimal LBP       Pt with fever of 103 intrapartum,                CV - RRR       LUNGS - CTA bilaterally       ABD - soft, approp tend, FF below umbilicus       EXT - tr edema bilaterally                Stable. Pt is breast feeding                 PPD #3  - Routine PP care.  Cont IV Abx until susceptibilties return.       Close obs temp curve.                    Obstetric and Gynecologic Disease GRG - Care Day 3 (2017) by Haja Noyola RN        Review Entered Review Status       2017 Completed       Details              Care Day: 3 Care Date: 2017 Level of Care: Inpatient Floor       Guideline Day 1        Clinical Status       (X) * Clinical Indications met [C]       2017 11:02 AM EST by Frances Petersen         tem max 103.1/ t 98.1   108/53 98. hr 90  r 18                     Interventions       (X) Inpatient interventions as needed              2017 11:02 AM EST by Frances Petersen       Subject: Additional Clinical Information       iv ampicillin q6hrs, tobramycin iv q8hrs, ivf 50 hr                                  * Milestone              Additional Notes        ob progress notes       Temp (24hrs), Av.9 °F (37.2 °C), Min:98.1 °F (36.7 °C), Max:100.3 °F (37.9 °C)       Assessment and Plan: Patient appears to be in good health despite the positive blood cultures. Will continue on IV antibiotics for now.           Obstetric and Gynecologic Disease GRG - Clinical Indications for Admission to Inpatient Care by Ambrocio Montez RN        Review Entered Review Status       2017 Completed       Details              Clinical Indications for Admission to Inpatient Care       Most Recent : Christianne Null Most Recent Date: 2017 10:50 AM EST       (X) Hospital admission is needed for appropriate care of the patient because of 1 or more of the        following  (1) (2) (3):          (X) Pregnant or postpartum patient requiring monitoring for severe heart failure, pulmonary disease,           or other comorbid condition (eg, peripartum cardiomyopathy) (4) (19)          (X) Postpartum complications, including severe lacerations, infections, or retained placenta (22)          2017 10:50 AM EST by Christianne Null            blood cultures obtained yesterday during labor are positive for gram+ bacteria.

## 2017-02-21 NOTE — PROGRESS NOTES
Pt discharged from room. Discharge  teaching complete, pt verbalizes understanding; questions encouraged. Patient walked to special care nursery to visit infant with RN. Stable at discharge.

## 2017-02-21 NOTE — DISCHARGE INSTRUCTIONS
Discharge instruction to follow: Activity: Pelvis rest for 6 weeks     No heavy lifting over 15 lbs for 2 weeks     No driving for 2 weeks     No push/pull motion such as sweeping or vacuuming for 2 weeks     No tub baths for 6 weeks    If using pierre-bottle continue to use until comfortable stopping. Change sanitary pad after each urination or bowel movement. Call MD for the following:      Fever over 101 F; pain not relieved by medication; foul smelling vaginal discharge or an increase in vaginal bleeding. Take medication as prescribed. Follow up with MD as order. After Your Delivery (the Postpartum Period): Care Instructions  Your Care Instructions  Congratulations on the birth of your baby. Like pregnancy, the  period can be a time of excitement, cortney, and exhaustion. You may look at your wondrous little baby and feel happy. You may also be overwhelmed by your new sleep hours and new responsibilities. At first, babies often sleep during the days and are awake at night. They do not have a pattern or routine. They may make sudden gasps, jerk themselves awake, or look like they have crossed eyes. These are all normal, and they may even make you smile. In these first weeks after delivery, try to take good care of yourself. It may take 4 to 6 weeks to feel like yourself again, and possibly longer if you had a  birth. You will likely feel very tired for several weeks. Your days will be full of ups and downs, but lots of cortney as well. Follow-up care is a key part of your treatment and safety. Be sure to make and go to all appointments, and call your doctor if you are having problems. It's also a good idea to know your test results and keep a list of the medicines you take. How can you care for yourself at home? Take care of your body after delivery  · Use pads instead of tampons for the bloody flow that may last as long as 2 weeks.   · Ease cramps with ibuprofen (Advil, Motrin). · Ease soreness of hemorrhoids and the area between your vagina and rectum with ice compresses or witch hazel pads. · Ease constipation by drinking lots of fluid and eating high-fiber foods. Ask your doctor about over-the-counter stool softeners. · Cleanse yourself with a gentle squeeze of warm water from a bottle instead of wiping with toilet paper. · Take a sitz bath in warm water several times a day. · Wear a good nursing bra. Ease sore and swollen breasts with warm, wet washcloths. · If you are not breastfeeding, use ice rather than heat for breast soreness. · Your period may not start for several months if you are breastfeeding. You may bleed more, and longer at first, than you did before you got pregnant. · Wait until you are healed (about 4 to 6 weeks) before you have sexual intercourse. Your doctor will tell you when it is okay to have sex. · Try not to travel with your baby for 5 or 6 weeks. If you take a long car trip, make frequent stops to walk around and stretch. Avoid exhaustion  · Rest every day. Try to nap when your baby naps. · Ask another adult to be with you for a few days after delivery. · Plan for  if you have other children. · Stay flexible so you can eat at odd hours and sleep when you need to. Both you and your baby are making new schedules. · Plan small trips to get out of the house. Change can make you feel less tired. · Ask for help with housework, cooking, and shopping. Remind yourself that your job is to care for your baby. Know about help for postpartum depression  · \"Baby blues\" are common for the first 1 to 2 weeks after birth. You may cry or feel sad or irritable for no reason. · Rest whenever you can. Being tired makes it harder to handle your emotions. · Go for walks with your baby. · Talk to your partner, friends, and family about your feelings.   · If your symptoms last for more than a few weeks, or if you feel very depressed, ask your doctor for help. · Postpartum depression can be treated. Support groups and counseling can help. Sometimes medicine can also help. Stay healthy  · Eat healthy foods so you have more energy, make good breast milk, and lose extra baby pounds. · If you breastfeed, avoid alcohol and drugs. Stay smoke-free. If you quit during pregnancy, congratulations. · Start daily exercise after 4 to 6 weeks, but rest when you feel tired. · Learn exercises to tone your belly. Do Kegel exercises to regain strength in your pelvic muscles. You can do these exercises while you stand or sit. ¨ Squeeze the same muscles you would use to stop your urine. Your belly and thighs should not move. ¨ Hold the squeeze for 3 seconds, and then relax for 3 seconds. ¨ Start with 3 seconds. Then add 1 second each week until you are able to squeeze for 10 seconds. ¨ Repeat the exercise 10 to 15 times for each session. Do three or more sessions each day. · Find a class for new mothers and new babies that has an exercise time. · If you had a  birth, give yourself a bit more time before you exercise, and be careful. When should you call for help? Call 911 anytime you think you may need emergency care. For example, call if:  · You passed out (lost consciousness). Call your doctor now or seek immediate medical care if:  · You have severe vaginal bleeding. This means you are passing blood clots and soaking through a pad each hour for 2 or more hours. · You are dizzy or lightheaded, or you feel like you may faint. · You have a fever. · You have new belly pain, or your pain gets worse. Watch closely for changes in your health, and be sure to contact your doctor if:  · Your vaginal bleeding seems to be getting heavier. · You have new or worse vaginal discharge. · You feel sad, anxious, or hopeless for more than a few days. · You do not get better as expected. Where can you learn more?   Go to http://abran-dar.info/. Enter A461 in the search box to learn more about \"After Your Delivery (the Postpartum Period): Care Instructions. \"  Current as of: May 30, 2016  Content Version: 11.1  © 2578-0286 [employer]. Care instructions adapted under license by adjust (which disclaims liability or warranty for this information). If you have questions about a medical condition or this instruction, always ask your healthcare professional. [address] any warranty or liability for your use of this information.

## 2017-02-21 NOTE — LACTATION NOTE
Met with mom while she visited infant in [address] [place] [address]. Mom states she has been pumping and obtained 60ml this morning. Milk coming in well. Mom only doing pump and bottle feeding now due to sore, damaged nipples. Observed nipples, still very damaged, dark scabs observed. Mom using nipple cream. Mom discharging today. Discussed continued need for diligent pumping. Pump at least 8 times in 24 hours. Allow nipples time to heal.  Mom will use pump at infant bedside while here and will use personal use pump when at home. Will return for pump rental if personal pump not working well. Undecided if she will put baby back to breast or not, may do only pump and bottle feeding. No needs or questions at present time.

## 2017-02-21 NOTE — PROGRESS NOTES
Patient is S/P vaginal delivery at 38 2/7 weeks. No complaints today. Lochia < menses. No GI/ issues. No F/C - AF > 48 hrs. No CP, SOB. CV - RRR  LUNGS - CTA bilaterally  ABD - soft, approp tend, FF below umbilicus  EXT - tr edema bilaterally          Labs:  No results found for this or any previous visit (from the past 24 hour(s)). PPD #4  , suspected chorio    Pt is breast feeding and plans on POP for birth control. Stable. Await final cx results.  Routine PP instructions      Alexandria Norman MD  8:35 AM  17

## 2017-02-21 NOTE — PROGRESS NOTES
Bedside report/SBAR completed with Ifeanyi Amos RN. Plan of care reviewed with patient, verbalizes understanding and care assumed.

## 2017-02-21 NOTE — PROGRESS NOTES
Report received from Angeles Dawn RN, and care assumed. Bedside report completed. Pt denies any needs at present.

## 2017-02-21 NOTE — DISCHARGE SUMMARY
Date of Admission:  2017  6:58 AM  Date of Discharge:  No discharge date for patient encounter. Nataliia Garcia 25 y.o.  presented at 38w2d for induction/in active labor. Pt had  without incident. See delivery note for all delivery details. Pt had intrapartum fever of 103. Blood cx drawn, IV Abx started. On day of D/C, she was ambulating well, afebrile, with lochia < menses. She was discharged home with medications as below. She plans on POP's for birth control. Routine PP instructions given to patient. She is to follow up with [facility] in 6 weeks for PP exam.    Current Discharge Medication List      START taking these medications    Details   citalopram (CELEXA) 20 mg tablet Take 1 Tab by mouth daily. Qty: 30 Tab, Refills: 2      oxyCODONE IR (ROXICODONE) 5 mg immediate release tablet Take 1 Tab by mouth every eight (8) hours as needed. Max Daily Amount: 15 mg.  Qty: 24 Tab, Refills: 0      ibuprofen (MOTRIN) 600 mg tablet Take 1 Tab by mouth every six (6) hours as needed for Pain. Qty: 60 Tab, Refills: 2      norethindrone (MICRONOR) 0.35 mg tab Take 1 Tab by mouth daily. Qty: 1 Package, Refills: 12         CONTINUE these medications which have NOT CHANGED    Details   prenatal no. 39-iron-FA #6-dha 30 mg iron-1 mg -300 mg cmpk Take 1 Cap by mouth daily.   Qty: 90 Each, Refills: 4         STOP taking these medications       acyclovir (ZOVIRAX) 400 mg tablet Comments:   Reason for Stopping:               Liss Cheung MD  8:40 AM  17

## 2017-02-24 LAB
ANTIMICROBIAL SUSCEPTIBILITY, 080575: ABNORMAL
BACTERIA ISLT: NORMAL
RESULT 1, 080571: ABNORMAL
SPECIMEN SOURCE: NORMAL

## 2017-02-26 LAB
BACTERIA SPEC CULT: ABNORMAL
GRAM STN SPEC: ABNORMAL
GRAM STN SPEC: ABNORMAL
SERVICE CMNT-IMP: ABNORMAL

## 2017-02-27 LAB
BACTERIA SPEC CULT: ABNORMAL
BACTERIA SPEC CULT: ABNORMAL
GRAM STN SPEC: ABNORMAL
GRAM STN SPEC: ABNORMAL
SERVICE CMNT-IMP: ABNORMAL

## 2017-02-27 NOTE — ADT AUTH CERT NOTES
Obstetric and Gynecologic Disease GRG - Care Day 4 (2017) by Gosia Mendoza RN        Review Entered Review Status       2017 Completed       Details              Care Day: 4 Care Date: 2017 Level of Care: Inpatient Floor       Guideline Day 2        Level Of Care       (X) Floor              Clinical Status       (X) * No ICU or intermediate care needs              Interventions       (X) Inpatient interventions continue       (X) Transition to oral routes              2017 3:21 PM EST by Emma Mobley       Subject: Additional Clinical Information        labs wbc 14.4 hg 10.8 k 3.4 vs 98.1 hr 69 18 109.79 meds iv ampicillin, iv tobramycin,                                   * Milestone              Additional Notes        progress note       Patient is S/P vaginal delivery at 38 2/7 weeks. No complaints today.       Lochia < menses. No GI/ issues. No F/C, CP, SOB. Minimal LBP       Pt with fever of 103 intrapartum,                CV - RRR       LUNGS - CTA bilaterally       ABD - soft, approp tend, FF below umbilicus       EXT - tr edema bilaterally                Stable. Pt is breast feeding                 PPD #3  - Routine PP care.  Cont IV Abx until susceptibilties return.       Close obs temp curve.                    Obstetric and Gynecologic Disease GRG - Care Day 3 (2017) by Gosia Mendoza, BLANKA        Review Entered Review Status       2017 Completed       Details              Care Day: 3 Care Date: 2017 Level of Care: Inpatient Floor       Guideline Day 1        Clinical Status       (X) * Clinical Indications met [C]       2017 11:02 AM EST by Emma Mobley         tem max 103.1/ t 98.1   108/53 98. hr 90  r 18                     Interventions       (X) Inpatient interventions as needed              2017 11:02 AM EST by Emma Mobley       Subject: Additional Clinical Information       iv ampicillin q6hrs, tobramycin iv q8hrs, ivf 50 hr                                 * Milestone              Additional Notes        ob progress notes       Temp (24hrs), Av.9 °F (37.2 °C), Min:98.1 °F (36.7 °C), Max:100.3 °F (37.9 °C)       Assessment and Plan: Patient appears to be in good health despite the positive blood cultures. Will continue on IV antibiotics for now.

## 2017-03-02 NOTE — ADT AUTH CERT NOTES
Obstetric and Gynecologic Disease GRG - Care Day 4 (2017) by Rosalee Mora, RN        Review Entered Review Status       2017 Completed       Details              Care Day: 4 Care Date: 2017 Level of Care: Inpatient Floor       Guideline Day 2        Level Of Care       (X) Floor              Clinical Status       (X) * No ICU or intermediate care needs              Interventions       (X) Inpatient interventions continue       (X) Transition to oral routes              2017 3:21 PM EST by Christianne Null       Subject: Additional Clinical Information        labs wbc 14.4 hg 10.8 k 3.4 vs 98.1 hr 69 18 109.79 meds iv ampicillin, iv tobramycin,                                   * Milestone              Additional Notes        progress note       Patient is S/P vaginal delivery at 38 2/7 weeks. No complaints today.       Lochia < menses. No GI/ issues. No F/C, CP, SOB. Minimal LBP       Pt with fever of 103 intrapartum,                CV - RRR       LUNGS - CTA bilaterally       ABD - soft, approp tend, FF below umbilicus       EXT - tr edema bilaterally                Stable. Pt is breast feeding                 PPD #3  - Routine PP care.  Cont IV Abx until susceptibilties return.       Close obs temp curve.                    Obstetric and Gynecologic Disease [facility] - Care Day 3 (2017) by Rosalee Mora, BLANKA        Review Entered Review Status       2017 Completed       Details              Care Day: 3 Care Date: 2017 Level of Care: Inpatient Floor       Guideline Day 1        Clinical Status       (X) * Clinical Indications met [C]       2017 11:02 AM EST by Christianne Null         tem max 103.1/ t 98.1   108/53 98. hr 90  r 18                     Interventions       (X) Inpatient interventions as needed              2017 11:02 AM EST by Christianne Null       Subject: Additional Clinical Information       iv ampicillin q6hrs, tobramycin iv q8hrs, ivf 50 hr                                  * Milestone              Additional Notes        ob progress notes       Temp (24hrs), Av.9 °F (37.2 °C), Min:98.1 °F (36.7 °C), Max:100.3 °F (37.9 °C)       Assessment and Plan: Patient appears to be in good health despite the positive blood cultures. Will continue on IV antibiotics for now.

## 2017-03-02 NOTE — ADT AUTH CERT NOTES
Obstetric and Gynecologic Disease [facility] - Care Day 4 (2017) by Jaclyn Calloway RN        Review Entered Review Status       2017 Completed       Details              Care Day: 4 Care Date: 2017 Level of Care: Inpatient Floor       Guideline Day 2        Level Of Care       (X) Floor              Clinical Status       (X) * No ICU or intermediate care needs              Interventions       (X) Inpatient interventions continue       (X) Transition to oral routes              2017 3:21 PM EST by Gian Rose       Subject: Additional Clinical Information        labs wbc 14.4 hg 10.8 k 3.4 vs 98.1 hr 69 18 109.79 meds iv ampicillin, iv tobramycin,                                   * Milestone              Additional Notes        progress note       Patient is S/P vaginal delivery at 38 2/7 weeks. No complaints today.       Lochia < menses. No GI/ issues. No F/C, CP, SOB. Minimal LBP       Pt with fever of 103 intrapartum,                CV - RRR       LUNGS - CTA bilaterally       ABD - soft, approp tend, FF below umbilicus       EXT - tr edema bilaterally                Stable. Pt is breast feeding                 PPD #3  - Routine PP care. Cont IV Abx until susceptibilties return.       Close obs temp curve.

## 2017-04-03 PROBLEM — R30.0 DYSURIA: Status: ACTIVE | Noted: 2017-04-03

## 2017-04-03 PROBLEM — B00.9 HERPES INFECTION DURING PREGNANCY IN THIRD TRIMESTER: Status: RESOLVED | Noted: 2017-01-11 | Resolved: 2017-04-03

## 2017-04-03 PROBLEM — O98.513 HERPES INFECTION DURING PREGNANCY IN THIRD TRIMESTER: Status: RESOLVED | Noted: 2017-01-11 | Resolved: 2017-04-03

## 2017-04-03 PROBLEM — R78.81 BACTEREMIA DUE TO GRAM-POSITIVE BACTERIA: Status: RESOLVED | Noted: 2017-02-18 | Resolved: 2017-04-03

## 2017-07-22 NOTE — L&D DELIVERY NOTE
Delivery Summary    Patient: Heron Joseph MRN: [id number]  SSN: [id number]    YOB: 1995  Age: 25 y.o. Sex: female       Information for the patient's :  Suresh Macdonald [[id number]]       Labor Events:    Labor: No   Rupture Date: 2017   Rupture Time: 12:51 PM   Rupture Type AROM   Amniotic Fluid Volume: Moderate    Amniotic Fluid Description: Clear None   Induction:         Augmentation: Oxytocin   Labor Events: Other (comment)     Cervical Ripening:     None     Delivery Events:  Episiotomy: None   Laceration(s): First degree perineal     Repaired: Yes    Number of Repair Packets: 1   Suture Type and Size: Chromic 3-0     Estimated Blood Loss (ml): 400ml       Delivery Date: 2017    Delivery Time: 9:23 PM  Delivery Type: Vaginal, Spontaneous Delivery  Sex:  Female     Gestational Age: 36w4d   Delivery Clinician:  Ulises Stanley  Living Status: Yes   Delivery Location: L&D            APGARS  One minute Five minutes Ten minutes   Skin color: 0   1        Heart rate: 2   2        Grimace: 2   2        Muscle tone: 2   2        Breathin   2        Totals: 8   9            Presentation: Vertex    Position: Left Occiput Anterior  Resuscitation Method:  Tactile Stimulation;Suctioning-bulb     Meconium Stained: None      Cord Vessels: 3 Vessels      Cord Events:    Cord Blood Sent?:  Yes    Blood Gases Sent?:  No    Placenta:  Date/Time:   9:29 PM  Removal: Spontaneous      Appearance: Normal;Intact      Measurements:  Birth Weight:        Birth Length:        Head Circumference:        Chest Circumference:       Abdominal Girth: Other Providers:   ULISES STANLEY;EIELEN WATERS;ALICIA CAI;YASMIN VILLELA;ISAIAH REN;YVES GAGE;CELINE CAI, Obstetrician;Primary Nurse;Primary  Nurse;Scrub Tech;Charge Nurse; Anesthesiologist;Crna           Group B Strep: Negative  Information for the patient's :  Suresh Macdonald [[id number]]     Lab Results   Component Value Date/Time    ABO/Rh(D) A POSITIVE 02/17/2017 09:23 PM    PEMA IgG NEG 02/17/2017 09:23 PM no

## 2020-12-29 PROBLEM — Z72.51 HIGH RISK SEXUAL BEHAVIOR: Status: ACTIVE | Noted: 2020-12-29

## 2020-12-29 PROBLEM — R30.0 DYSURIA: Status: RESOLVED | Noted: 2017-04-03 | Resolved: 2020-12-29

## 2020-12-29 PROBLEM — N89.8 VAGINAL DISCHARGE: Status: ACTIVE | Noted: 2020-12-29

## 2022-03-20 PROBLEM — N89.8 VAGINAL DISCHARGE: Status: ACTIVE | Noted: 2020-12-29

## 2022-03-20 PROBLEM — Z72.51 HIGH RISK SEXUAL BEHAVIOR: Status: ACTIVE | Noted: 2020-12-29

## 2022-09-27 NOTE — PROGRESS NOTES
29/F BIB SELF C/C VAGINAL BLEEDING X 2022 S/P  PILLS. PER PATIENT 
SHE PASSED A LARGE +CRAMPING EARLIER TODAY. PATIENT HAS HAD 2 MISSCARRIAGES, 2 
VACCUM ASSISTED  AND 3 WITH  PILLS. 



[id number]

LMP 7/6

PMHX DENIES

NKA Labs pending

## 2022-11-29 ENCOUNTER — OFFICE VISIT (OUTPATIENT)
Dept: OBGYN CLINIC | Age: 27
End: 2022-11-29
Payer: COMMERCIAL

## 2022-11-29 VITALS
DIASTOLIC BLOOD PRESSURE: 70 MMHG | BODY MASS INDEX: 25.49 KG/M2 | WEIGHT: 135 LBS | HEIGHT: 61 IN | SYSTOLIC BLOOD PRESSURE: 114 MMHG

## 2022-11-29 DIAGNOSIS — Z11.3 SCREEN FOR STD (SEXUALLY TRANSMITTED DISEASE): ICD-10-CM

## 2022-11-29 DIAGNOSIS — N89.8 VAGINA ITCHING: Primary | ICD-10-CM

## 2022-11-29 DIAGNOSIS — Z12.4 PAP SMEAR FOR CERVICAL CANCER SCREENING: ICD-10-CM

## 2022-11-29 DIAGNOSIS — O03.9 MISCARRIAGE: ICD-10-CM

## 2022-11-29 DIAGNOSIS — Z01.419 WOMEN'S ANNUAL ROUTINE GYNECOLOGICAL EXAMINATION: ICD-10-CM

## 2022-11-29 LAB
BILIRUBIN, URINE, POC: NEGATIVE
BLOOD URINE, POC: NEGATIVE
GLUCOSE URINE, POC: NEGATIVE
HCG SERPL-ACNC: 22 MIU/ML (ref 0–6)
HCG, PREGNANCY, URINE, POC: ABNORMAL
HCV AB SER QL: NONREACTIVE
HIV 1+2 AB+HIV1 P24 AG SERPL QL IA: NONREACTIVE
HIV 1/2 RESULT COMMENT: NORMAL
KETONES, URINE, POC: NEGATIVE
LEUKOCYTE ESTERASE, URINE, POC: ABNORMAL
NITRITE, URINE, POC: NEGATIVE
PH, URINE, POC: 5.5 (ref 4.6–8)
PROTEIN,URINE, POC: NEGATIVE
SPECIFIC GRAVITY, URINE, POC: 1.03 (ref 1–1.03)
URINALYSIS CLARITY, POC: CLEAR
URINALYSIS COLOR, POC: YELLOW
UROBILINOGEN, POC: ABNORMAL
VALID INTERNAL CONTROL, POC: YES

## 2022-11-29 PROCEDURE — 99395 PREV VISIT EST AGE 18-39: CPT | Performed by: NURSE PRACTITIONER

## 2022-11-29 PROCEDURE — 81025 URINE PREGNANCY TEST: CPT | Performed by: NURSE PRACTITIONER

## 2022-11-29 PROCEDURE — 81001 URINALYSIS AUTO W/SCOPE: CPT | Performed by: NURSE PRACTITIONER

## 2022-11-29 NOTE — PROGRESS NOTES
I have reviewed the patient's visit today including history, exam and assessment by ELOINA Quach. I agree with treatment/plan as above.     Mauricio Sanchez MD  1:15 PM  11/29/22

## 2022-11-29 NOTE — PROGRESS NOTES
Pt comes in today for AE. Pt reports some urinary pain and vaginal itching. Pt just had a miscarriage about 3 weeks ago. Pt would like to discuss birth control. LAST PAP:  08/18/2016 Negative     LAST MAMMO:  Never    LMP:  Patient's last menstrual period was 09/14/2022 (approximate).     BIRTH CONTROL:  none    TOBACCO USE:  No    FAMILY HISTORY OF:   Breast Cancer:  No   Ovarian Cancer:  No   Uterine Cancer:  No   Colon Cancer:  No    Vitals:    11/29/22 1122   BP: 114/70   Site: Left Upper Arm   Position: Sitting   Weight: 135 lb (61.2 kg)   Height: 5' 1\" (1.549 m)        Norberto Santillan MA  11/29/22  11:36 AM

## 2022-11-29 NOTE — ASSESSMENT & PLAN NOTE
Pap today + std screening  mammo n/a  D/W pt at length multiple options, risks, benefits, SE's about OCP's, Nuvaring, Patch, Nexplanon, Depo and IUD including but not limited to H/A, N/V, thromboembolic events, bleeding patterns, weight gain and efficacy. Also, reminded pt that these methods do NOT protect against STD's - condoms are encouraged. Aches s/s reviewed    Patient denies h/o DVT, stroke, MI, migraines, liver disease or HTN  Pt desires OCPs, UPT faint POS.  Will check bHCG and if trending down plan to start OCPs  Pt desires Lo Loestrin due to not tolerating higher dose OCPs in the past
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
## 2022-11-30 ENCOUNTER — TELEPHONE (OUTPATIENT)
Dept: OBGYN CLINIC | Age: 27
End: 2022-11-30

## 2022-11-30 DIAGNOSIS — O03.9 MISCARRIAGE: Primary | ICD-10-CM

## 2022-11-30 LAB — RPR SER QL: NONREACTIVE

## 2022-11-30 NOTE — TELEPHONE ENCOUNTER
bHCG 22. This could still be elevated from recent miscarriage. Recommend return to office in 1 week for bHCG. Order is placed.  If bHCG dropping, we can then start rx for OCPs

## 2022-11-30 NOTE — TELEPHONE ENCOUNTER
RN called patient, patient verified Prosper Mayorga proceeded to update patient on recent bHCG labs and the recommendation to repeat bHCG labs next week prior to the ability to discuss about OCPs. RN booked patients lab visit on 12/07/2022 at 2:45pm, patient verbalized understanding and confirmation.

## 2022-12-02 LAB
A VAGINAE DNA VAG QL NAA+PROBE: ABNORMAL SCORE
BVAB2 DNA VAG QL NAA+PROBE: ABNORMAL SCORE
C ALBICANS DNA VAG QL NAA+PROBE: POSITIVE
C GLABRATA DNA VAG QL NAA+PROBE: NEGATIVE
C TRACH RRNA SPEC QL NAA+PROBE: NEGATIVE
CYTOLOGIST CVX/VAG CYTO: NORMAL
CYTOLOGY CVX/VAG DOC THIN PREP: NORMAL
HPV REFLEX: NORMAL
Lab: NORMAL
MEGA1 DNA VAG QL NAA+PROBE: ABNORMAL SCORE
N GONORRHOEA RRNA SPEC QL NAA+PROBE: NEGATIVE
PATH REPORT.FINAL DX SPEC: NORMAL
SPECIMEN SOURCE: ABNORMAL
STAT OF ADQ CVX/VAG CYTO-IMP: NORMAL
T VAGINALIS RRNA SPEC QL NAA+PROBE: NEGATIVE

## 2022-12-05 ENCOUNTER — TELEPHONE (OUTPATIENT)
Dept: OBGYN CLINIC | Age: 27
End: 2022-12-05

## 2022-12-05 DIAGNOSIS — B96.89 BACTERIAL VAGINOSIS: Primary | ICD-10-CM

## 2022-12-05 DIAGNOSIS — B37.9 YEAST INFECTION: ICD-10-CM

## 2022-12-05 DIAGNOSIS — N76.0 BACTERIAL VAGINOSIS: Primary | ICD-10-CM

## 2022-12-05 RX ORDER — METRONIDAZOLE 7.5 MG/G
1 GEL VAGINAL DAILY
Qty: 70 G | Refills: 0 | Status: SHIPPED | OUTPATIENT
Start: 2022-12-05 | End: 2022-12-10

## 2022-12-05 RX ORDER — FLUCONAZOLE 150 MG/1
150 TABLET ORAL
Qty: 2 TABLET | Refills: 0 | Status: SHIPPED | OUTPATIENT
Start: 2022-12-05 | End: 2022-12-11

## 2022-12-05 NOTE — TELEPHONE ENCOUNTER
RN called patient, patient verbalized correct PHIs, RN proceeded to update patient that swab came back positive for BV and yeast. RN gave patient medication options for both dx's. Patient verbalized for BV that they wanted metrogel and for yeast they wanted Diflucan, patient confirmed they were not pregnant and currently on menses. RN verified patients pharmacy, patient confirmed. Patient also confirmed their upcoming appointment for 12/07/2022 for lab draw.

## 2022-12-05 NOTE — TELEPHONE ENCOUNTER
Patient tested positive for Bacterial Vaginosis. If having symptoms may have one of the following for treatment if not allergic    Flagyl 500mg PO BID for 7 days, #14, No Refills   OR    Metrogel apply 1 applicator nightly for 5 nights, #5, No Refills            2. Patient positive for yeast infection.  If having symptoms, can have one of the following for treatment, if not allergic    Diflucan 150 mg PO Take 1 tab and repeat in 3 days if needed, #2, No Refills (NOT TO BE PRESCRIBED IF PREGNANT)    OR    Terazol 7 Apply 1 applicator nightly for 7 nights, #7, No Refills

## 2022-12-07 DIAGNOSIS — O03.9 MISCARRIAGE: ICD-10-CM

## 2022-12-08 ENCOUNTER — TELEPHONE (OUTPATIENT)
Dept: OBGYN CLINIC | Age: 27
End: 2022-12-08

## 2022-12-08 LAB — HCG SERPL-ACNC: 4 MIU/ML (ref 0–6)

## 2022-12-08 RX ORDER — NORETHINDRONE ACETATE AND ETHINYL ESTRADIOL 1MG-20(21)
1 KIT ORAL DAILY
Qty: 1 PACKET | Refills: 12 | Status: SHIPPED | OUTPATIENT
Start: 2022-12-08

## 2022-12-08 NOTE — TELEPHONE ENCOUNTER
RN sent [employer] message w/ bHCG result and updating patient that it is okay to start birth control and that it was sent into pharmacy.

## 2022-12-08 NOTE — TELEPHONE ENCOUNTER
RN called patient, call went to voicemail, RN left office phone number for patient to call back, and RN stated for patient to refer to [employer] message sent for the reasoning of the phone call.

## 2022-12-29 PROBLEM — Z01.419 WOMEN'S ANNUAL ROUTINE GYNECOLOGICAL EXAMINATION: Status: RESOLVED | Noted: 2022-11-29 | Resolved: 2022-12-29

## 2023-03-02 ENCOUNTER — TELEPHONE (OUTPATIENT)
Dept: OBGYN CLINIC | Age: 28
End: 2023-03-02

## 2023-03-02 NOTE — TELEPHONE ENCOUNTER
Pt lvm stating she was returning call. Pt stated there is no pain or burning when she pees, but always has to go. Advised pt that we can schedule an appt for her to be seen. Pt voiced understanding and appt scheduled. Pt stated if symptoms get better then she will cancel.

## 2023-03-13 ENCOUNTER — OFFICE VISIT (OUTPATIENT)
Dept: OBGYN CLINIC | Age: 28
End: 2023-03-13
Payer: COMMERCIAL

## 2023-03-13 VITALS
SYSTOLIC BLOOD PRESSURE: 112 MMHG | DIASTOLIC BLOOD PRESSURE: 74 MMHG | HEIGHT: 61 IN | WEIGHT: 141 LBS | BODY MASS INDEX: 26.62 KG/M2

## 2023-03-13 DIAGNOSIS — R35.0 URINARY FREQUENCY: Primary | ICD-10-CM

## 2023-03-13 DIAGNOSIS — R35.0 URINARY FREQUENCY: ICD-10-CM

## 2023-03-13 PROBLEM — N89.8 VAGINAL DISCHARGE: Status: RESOLVED | Noted: 2020-12-29 | Resolved: 2023-03-13

## 2023-03-13 PROBLEM — Z72.51 HIGH RISK SEXUAL BEHAVIOR: Status: RESOLVED | Noted: 2020-12-29 | Resolved: 2023-03-13

## 2023-03-13 LAB
BILIRUBIN, URINE, POC: NEGATIVE
BLOOD URINE, POC: NEGATIVE
GLUCOSE URINE, POC: NEGATIVE
HCG, PREGNANCY, URINE, POC: NEGATIVE
KETONES, URINE, POC: NEGATIVE
LEUKOCYTE ESTERASE, URINE, POC: NEGATIVE
NITRITE, URINE, POC: NEGATIVE
PH, URINE, POC: 7 (ref 4.6–8)
PROTEIN,URINE, POC: NEGATIVE
SPECIFIC GRAVITY, URINE, POC: 1.02 (ref 1–1.03)
URINALYSIS CLARITY, POC: CLEAR
URINALYSIS COLOR, POC: YELLOW
UROBILINOGEN, POC: NORMAL
VALID INTERNAL CONTROL, POC: YES

## 2023-03-13 PROCEDURE — 81001 URINALYSIS AUTO W/SCOPE: CPT | Performed by: NURSE PRACTITIONER

## 2023-03-13 PROCEDURE — 81025 URINE PREGNANCY TEST: CPT | Performed by: NURSE PRACTITIONER

## 2023-03-13 PROCEDURE — 99214 OFFICE O/P EST MOD 30 MIN: CPT | Performed by: NURSE PRACTITIONER

## 2023-03-13 RX ORDER — TOLTERODINE 2 MG/1
2 CAPSULE, EXTENDED RELEASE ORAL DAILY
Qty: 30 CAPSULE | Refills: 11 | Status: SHIPPED | OUTPATIENT
Start: 2023-03-13

## 2023-03-13 SDOH — ECONOMIC STABILITY: HOUSING INSECURITY
IN THE LAST 12 MONTHS, WAS THERE A TIME WHEN YOU DID NOT HAVE A STEADY PLACE TO SLEEP OR SLEPT IN A SHELTER (INCLUDING NOW)?: PATIENT REFUSED

## 2023-03-13 SDOH — ECONOMIC STABILITY: FOOD INSECURITY: WITHIN THE PAST 12 MONTHS, YOU WORRIED THAT YOUR FOOD WOULD RUN OUT BEFORE YOU GOT MONEY TO BUY MORE.: PATIENT DECLINED

## 2023-03-13 SDOH — ECONOMIC STABILITY: FOOD INSECURITY: WITHIN THE PAST 12 MONTHS, THE FOOD YOU BOUGHT JUST DIDN'T LAST AND YOU DIDN'T HAVE MONEY TO GET MORE.: PATIENT DECLINED

## 2023-03-13 SDOH — ECONOMIC STABILITY: INCOME INSECURITY: HOW HARD IS IT FOR YOU TO PAY FOR THE VERY BASICS LIKE FOOD, HOUSING, MEDICAL CARE, AND HEATING?: PATIENT DECLINED

## 2023-03-13 ASSESSMENT — PATIENT HEALTH QUESTIONNAIRE - PHQ9
SUM OF ALL RESPONSES TO PHQ9 QUESTIONS 1 & 2: 2
SUM OF ALL RESPONSES TO PHQ QUESTIONS 1-9: 2
2. FEELING DOWN, DEPRESSED OR HOPELESS: 1
SUM OF ALL RESPONSES TO PHQ QUESTIONS 1-9: 2
1. LITTLE INTEREST OR PLEASURE IN DOING THINGS: 1
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
## 2023-03-13 NOTE — ASSESSMENT & PLAN NOTE
Long discussion today regarding symptoms  UA unremarkable, Cx pending  Diff Dx: primary OAB, IC,     We review at length dietary recommendation changes including limiting evening fluid consumption, IC diet reviewed. Also recommend pelvic floor PT and pt agreeable. Referral sent. Rx sent for detrol.  Anticholinergic precautions and side effects reviewed    RTO 2 months

## 2023-03-13 NOTE — PROGRESS NOTES
Pt comes in today with urinary problems. Pt reports a constant urge to pee and pees 24/7. Also keeps her up at night or she has to get up in the middle of the night. Reports that sometimes it is a lot and other times it is just a little pee. Pt has been having this feeling for about a year but recently has gotten worse. LAST PAP:  11/29/2022 Negative     LAST MAMMO:  Never    LMP:  Patient's last menstrual period was 02/20/2023 (approximate).     BIRTH CONTROL:  none    TOBACCO USE:  No    FAMILY HISTORY OF:   Breast Cancer:  No   Ovarian Cancer:  No   Uterine Cancer:  No   Colon Cancer:  No    Vitals:    03/13/23 1110   BP: 112/74   Site: Left Upper Arm   Position: Sitting   Weight: 141 lb (64 kg)   Height: 5' 1\" (1.549 m)        Bobby Núñez MA  03/13/23  11:19 AM

## 2023-03-16 LAB
BACTERIA SPEC CULT: NORMAL
SERVICE CMNT-IMP: NORMAL

## 2023-03-19 NOTE — PROGRESS NOTES
I have reviewed the patient's visit today including history, exam and assessment by ELOINA Crisostomo. I agree with treatment/plan as above.     Nina Conrad MD  4:21 PM  03/19/23

## 2023-05-15 NOTE — PROGRESS NOTES
ball, both got blood infection (antibiotics 5 days). Had tearing and intense blood clots. Treatment   THERAPEUTIC EXERCISE: (15 minutes):    Exercises per grid below to improve mobility, strength, and coordination. Required minimal visual, verbal, manual, and tactile cues to promote proper body alignment, promote proper body posture, promote proper body mechanics, and promote proper body breathing techniques. Progressed resistance, range, repetitions, and complexity of movement as indicated. Date:  5-17-23 Date:   Date:     Activity/Exercise Parameters Parameters Parameters   Patient Education Discussed HEP and POC     SKTC 30 sec holds       Figure 4 30 sec holds          All exercises performed with emphasis on kegel and breathing in order improve coordination, awareness and to minimize symptoms. MANUAL THERAPY: ( 15 minutes): to improve soft tissue tone    Date Type Location Comments   5/17/2023 Internal assessment/treatment Via vaginal canal SP, CTM                                       (Used abbreviations: MET - muscle energy technique; SCS- Strain counter strain; CTM-Connective tissue mobilizations; CR- Contract/Relax; SP- Sustained pressure; PIT- Positional inhibition techniques; STM Soft -tissue mobilization; MM- Myofascial mobilization; TrP-Trigger point release; IASTM- Instrument assisted soft tissue mobilizations, TDN-Trigger point dry needling)    Pt gives verbal consent to internal vaginal assessment/treatment without chaperon present. NEURO REEDUCATION: (30 minutes) to improve control and coordination of pelvic floor     Date:  5-17-23 Date:   Date:     Activity/Exercise Parameters Parameters Parameters   Biofeedback With sEMG was utilized for coordination of PFM.  Supine, Sitting, Standing                                               THERAPEUTIC ACTIVITY: ( 0 minutes):     TA Educational Topic Notes Date Completed   Pathology/Anatomy/PFM Function  4-10-23   Bladder health education

## 2023-05-17 ENCOUNTER — HOSPITAL ENCOUNTER (OUTPATIENT)
Dept: PHYSICAL THERAPY | Age: 28
Setting detail: RECURRING SERIES
Discharge: HOME | End: 2023-05-20
Payer: COMMERCIAL

## 2023-05-17 PROCEDURE — 97110 THERAPEUTIC EXERCISES: CPT

## 2023-05-17 PROCEDURE — 97112 NEUROMUSCULAR REEDUCATION: CPT

## 2023-05-17 PROCEDURE — 97140 MANUAL THERAPY 1/> REGIONS: CPT

## 2023-05-30 NOTE — PROGRESS NOTES
Lorrie Hurtado  : 1995  Primary: [address] (Medicaid Managed)  Secondary:  [address] @ [facility] [employer]  [address] [address]  Phone: [phone]  Fax: [phone] Plan Frequency: one time a week for 90 days  Plan of Care/Certification Expiration Date: 23      >PT Visit Info:  Plan Frequency: one time a week for 90 days  Plan of Care/Certification Expiration Date: 23      Visit Count:  3    OUTPATIENT PHYSICAL THERAPY:OP NOTE TYPE: Treatment Note 2023       Episode  }Appt Desk             Treatment Diagnosis:  Lack of coordination (muscle incoordination) (R27.8)  Pelvic floor dysfunction in female (M62.98)  Frequency of micturition (R35.0)  Pelvic and perineal pain (R10.2)  Urgency of urination (R39.15)  Medical/Referring Diagnosis:  Urinary frequency [R35.0]  Referring Physician:  LORETTA Escoto - CNP  MD Orders:  PT Eval and Treat   Date of Onset:  No data recorded   Allergies:   Pertussis vaccine  Restrictions/Precautions:  No data recorded  No data recorded   Interventions Planned (Treatment may consist of any combination of the following):    Current Treatment Recommendations: Strengthening; ROM; ADL/Self-care training; Neuromuscular re-education; Manual; Home exercise program; Patient/Caregiver education & training; Equipment evaluation, education, & procurement; Positioning; Therapeutic activities     >Subjective Comments: Patient reports she now releases rather than pushing for going to the bathroom. Isn't waking up as often at night. Isn't having pelvic pain anymore. >Initial:      /10>Post Session:        /10  Medications Last Reviewed:  2023  Updated Objective Findings:    Ms. Wendy Purdy is a 28 yo female referred to pelvic floor physical therapy (PFPT) by LORETTA Ecsoto - * 2/2 Urinary frequency [R35.0] Pt reports that symptoms began a few years ago. She feels like she is constantly peeing.  She is getting

## 2023-05-31 ENCOUNTER — HOSPITAL ENCOUNTER (OUTPATIENT)
Dept: PHYSICAL THERAPY | Age: 28
Setting detail: RECURRING SERIES
Discharge: HOME | End: 2023-06-03
Payer: COMMERCIAL

## 2023-05-31 PROCEDURE — 97530 THERAPEUTIC ACTIVITIES: CPT

## 2023-05-31 PROCEDURE — 97110 THERAPEUTIC EXERCISES: CPT

## 2023-05-31 PROCEDURE — 97140 MANUAL THERAPY 1/> REGIONS: CPT

## 2023-06-12 DIAGNOSIS — R35.0 URINARY FREQUENCY: ICD-10-CM

## 2023-06-12 LAB
ALBUMIN SERPL-MCNC: 4.1 G/DL (ref 3.5–5)
ALBUMIN/GLOB SERPL: 1.3 (ref 0.4–1.6)
ALP SERPL-CCNC: 46 U/L (ref 50–136)
ALT SERPL-CCNC: 21 U/L (ref 12–65)
ANION GAP SERPL CALC-SCNC: 4 MMOL/L (ref 2–11)
AST SERPL-CCNC: 13 U/L (ref 15–37)
BILIRUB SERPL-MCNC: 0.4 MG/DL (ref 0.2–1.1)
BUN SERPL-MCNC: 11 MG/DL (ref 6–23)
CALCIUM SERPL-MCNC: 8.4 MG/DL (ref 8.3–10.4)
CHLORIDE SERPL-SCNC: 110 MMOL/L (ref 101–110)
CO2 SERPL-SCNC: 24 MMOL/L (ref 21–32)
CREAT SERPL-MCNC: 0.7 MG/DL (ref 0.6–1)
ERYTHROCYTE [DISTWIDTH] IN BLOOD BY AUTOMATED COUNT: 12.5 % (ref 11.9–14.6)
GLOBULIN SER CALC-MCNC: 3.1 G/DL (ref 2.8–4.5)
GLUCOSE SERPL-MCNC: 92 MG/DL (ref 65–100)
HCT VFR BLD AUTO: 37.3 % (ref 35.8–46.3)
HGB BLD-MCNC: 13 G/DL (ref 11.7–15.4)
MCH RBC QN AUTO: 31.3 PG (ref 26.1–32.9)
MCHC RBC AUTO-ENTMCNC: 34.9 G/DL (ref 31.4–35)
MCV RBC AUTO: 89.9 FL (ref 82–102)
NRBC # BLD: 0 K/UL (ref 0–0.2)
PLATELET # BLD AUTO: 180 K/UL (ref 150–450)
PMV BLD AUTO: 10.8 FL (ref 9.4–12.3)
POTASSIUM SERPL-SCNC: 3.8 MMOL/L (ref 3.5–5.1)
PROT SERPL-MCNC: 7.2 G/DL (ref 6.3–8.2)
RBC # BLD AUTO: 4.15 M/UL (ref 4.05–5.2)
SODIUM SERPL-SCNC: 138 MMOL/L (ref 133–143)
TSH, 3RD GENERATION: 1.05 UIU/ML (ref 0.36–3.74)
WBC # BLD AUTO: 5.3 K/UL (ref 4.3–11.1)

## 2023-06-13 LAB
EST. AVERAGE GLUCOSE BLD GHB EST-MCNC: 94 MG/DL
HBA1C MFR BLD: 4.9 % (ref 4.8–5.6)

## 2023-06-28 ENCOUNTER — TELEPHONE (OUTPATIENT)
Dept: OBGYN CLINIC | Age: 28
End: 2023-06-28

## 2023-10-19 ENCOUNTER — OFFICE VISIT (OUTPATIENT)
Dept: UROGYNECOLOGY | Age: 28
End: 2023-10-19

## 2023-10-19 VITALS — BODY MASS INDEX: 26.55 KG/M2 | HEIGHT: 61 IN | WEIGHT: 140.6 LBS

## 2023-10-19 DIAGNOSIS — N30.10 INTERSTITIAL CYSTITIS: Primary | ICD-10-CM

## 2023-10-19 LAB
BILIRUBIN, URINE, POC: NEGATIVE
BLOOD URINE, POC: NEGATIVE
GLUCOSE URINE, POC: NEGATIVE
KETONES, URINE, POC: NEGATIVE
LEUKOCYTE ESTERASE, URINE, POC: NEGATIVE
NITRITE, URINE, POC: NEGATIVE
PH, URINE, POC: 5.5 (ref 4.6–8)
PROTEIN,URINE, POC: NEGATIVE
SPECIFIC GRAVITY, URINE, POC: >=1.03 (ref 1–1.03)
URINALYSIS CLARITY, POC: CLEAR
URINALYSIS COLOR, POC: YELLOW
UROBILINOGEN, POC: NORMAL

## 2023-10-19 RX ORDER — METHENAMINE, SODIUM PHOSPHATE, MONOBASIC, MONOHYDRATE, PHENYL SALICYLATE, METHYLENE BLUE, AND HYOSCYAMINE SULFATE 118; 40.8; 36; 10; .12 MG/1; MG/1; MG/1; MG/1; MG/1
1 CAPSULE ORAL DAILY
Qty: 30 CAPSULE | Refills: 11 | Status: SHIPPED | OUTPATIENT
Start: 2023-10-19

## 2023-10-19 NOTE — PROGRESS NOTES
[facility] UROGYNECOLOGY  [address] [facility]  Dept: [phone]        PCP:  Liang Biggs MD    10/19/2023    HPI:  I am being asked to see this patient in consultation by  Lanny Valenzuela   for New Patient (Urinary frequency)  . Ms. Burnard Kanner  has been having frequency of urine for a year. She feels like she has to urinate 24/7. She doesn't notice anything that makes it worse or better. She has tried PT (doesn't remember who she saw but it was not beneficial), she has tried medication Myrbetriq. She has had procedures/surgery for this in the past.     She voids more than 1x an hour during the day. - does not feel like she empties bladder  She voids 4-5 times over night. She has 7-14 BM per week, and does not strain. She drinks occ red bull caffeine drinks beverages per day. Quit coffee 3 weeks ago, only kombucha (started 1 week ago). She uses 0 artificial sweeteners per day. She drinks 10 shots for alcoholic beverages per week (usually just a Friday). She has pelvic surgery in the past.   Her last PAP: 11/2022  No components found for: \"[id number]\"  Her last Colonoscopy: never  Her last Mammogram: never    She does not have a history of DM. Lab Results   Component Value Date    LABA1C 4.9 06/12/2023     Lab Results   Component Value Date    EAG 94 06/12/2023       She does not have a history of sleep apnea. Tobacco: vapes when drinking (1-2 times a week)    Sexual History: single partner, contraception - none. (No intercourse pain)    Notes were reviewed from the referring provider Dr Lanny Valenzuela.   Results were reviewed from prior tests:     Results for orders placed or performed in visit on 10/19/23   AMB POC URINALYSIS DIP STICK AUTO W/O MICRO   Result Value Ref Range    Color, Urine, POC Yellow     Clarity, Urine, POC Clear     Glucose, Urine, POC Negative Negative    Bilirubin, Urine, POC Negative Negative    Ketones, Urine, POC Negative Negative    Specific

## 2023-10-19 NOTE — PATIENT INSTRUCTIONS
Interstitial cystitis  Assessment & Plan:  We discussed her new diagnosis of interstitial cystitis/painful bladder syndrome (IC/PBS). We discussed the different theories regarding the pathogenesis. We discussed management options which include medical management, dietary modification, bladder instillations, and other treatments. Bladder Diet    Physical Therapy: We discussed the purpose of physical therapy which is to strengthen the pelvic floor muscles and teach proper coordination of those muscles. I described the anatomy of those muscles involved and their relationship to the end-organs in the pelvis. I described therapy techniques which include a combination of therapeutic exercise, biofeedback, neuromuscular re-education, home programs, and electrical stimulation, as well as therapeutic massage and ultrasound for pain. Urogesic blue/ Uribel: Treats painful urination, irritating symptoms of the urinary tract due to urinary tract infections or IC. Urogesic blue/ Ritter Essex is a urianry antiseptic and antispasmodic combination. It works by helping to decrease inflammation, reducing muscle spasms. A prescription was written out for you. Please take this once per day as needed. You may take it up to 4 times per day if symptoms are very bothersome. Prelief: Helps neutralize the acid in foods. Take 2 with every meal, especially with foods you know will irritate your bladder. This is purchased over the counter, or online. Bladder Ease: This is a supplement that helps to decrease bladder inflammation. You make take 1 by mouth twice daily. This is like a vitamin for the bladder. This is purchased over the counter or online. Marshmallow Root: This has a soothing effect and can help the inflamed and irritated tissue of not only the respiratory organs, but also the digestive tract and urinary organs.  The root is also said to help increase the secretion and flow of urine and is considered to be a good

## 2023-10-19 NOTE — ASSESSMENT & PLAN NOTE
Interstitial cystitis  Assessment & Plan:  We discussed her new diagnosis of interstitial cystitis/painful bladder syndrome (IC/PBS). We discussed the different theories regarding the pathogenesis. We discussed management options which include medical management, dietary modification, bladder instillations, and other treatments. Bladder Diet    Physical Therapy: We discussed the purpose of physical therapy which is to strengthen the pelvic floor muscles and teach proper coordination of those muscles. I described the anatomy of those muscles involved and their relationship to the end-organs in the pelvis. I described therapy techniques which include a combination of therapeutic exercise, biofeedback, neuromuscular re-education, home programs, and electrical stimulation, as well as therapeutic massage and ultrasound for pain. She is s/p PT w Bibi Jimenes blue/ Uribel: Treats painful urination, irritating symptoms of the urinary tract due to urinary tract infections or IC. Urogesic blue/ Wolm Chafe is a urianry antiseptic and antispasmodic combination. It works by helping to decrease inflammation, reducing muscle spasms. A prescription was written out for you. Please take this once per day as needed. You may take it up to 4 times per day if symptoms are very bothersome. Prelief: Helps neutralize the acid in foods. Take 2 with every meal, especially with foods you know will irritate your bladder. This is purchased over the counter, or online. Bladder Ease: This is a supplement that helps to decrease bladder inflammation. You make take 1 by mouth twice daily. This is like a vitamin for the bladder. This is purchased over the counter or online. Marshmallow Root: This has a soothing effect and can help the inflamed and irritated tissue of not only the respiratory organs, but also the digestive tract and urinary organs.  The root is also said to help increase the secretion and flow of urine and is

## 2025-03-20 ENCOUNTER — OFFICE VISIT (OUTPATIENT)
Dept: INTERNAL MEDICINE CLINIC | Facility: CLINIC | Age: 30
End: 2025-03-20
Payer: COMMERCIAL

## 2025-03-20 VITALS
BODY MASS INDEX: 24.7 KG/M2 | HEIGHT: 61 IN | HEART RATE: 81 BPM | DIASTOLIC BLOOD PRESSURE: 66 MMHG | TEMPERATURE: 98.2 F | SYSTOLIC BLOOD PRESSURE: 108 MMHG | WEIGHT: 130.8 LBS | OXYGEN SATURATION: 99 %

## 2025-03-20 DIAGNOSIS — Z76.89 ENCOUNTER TO ESTABLISH CARE: Primary | ICD-10-CM

## 2025-03-20 DIAGNOSIS — F41.9 ANXIETY AND DEPRESSION: ICD-10-CM

## 2025-03-20 DIAGNOSIS — F32.A ANXIETY AND DEPRESSION: ICD-10-CM

## 2025-03-20 DIAGNOSIS — N30.10 INTERSTITIAL CYSTITIS: ICD-10-CM

## 2025-03-20 DIAGNOSIS — G47.00 INSOMNIA, UNSPECIFIED TYPE: ICD-10-CM

## 2025-03-20 PROBLEM — F39 MOOD DISORDER: Status: ACTIVE | Noted: 2017-12-06

## 2025-03-20 PROBLEM — F33.2 MAJOR DEPRESSIVE DISORDER, RECURRENT SEVERE WITHOUT PSYCHOTIC FEATURES (HCC): Chronic | Status: ACTIVE | Noted: 2017-12-07

## 2025-03-20 PROCEDURE — 99204 OFFICE O/P NEW MOD 45 MIN: CPT | Performed by: NURSE PRACTITIONER

## 2025-03-20 RX ORDER — MINOCYCLINE HYDROCHLORIDE 100 MG/1
100 CAPSULE ORAL 2 TIMES DAILY
COMMUNITY

## 2025-03-20 RX ORDER — GABAPENTIN 300 MG/1
300 CAPSULE ORAL 2 TIMES DAILY
COMMUNITY
Start: 2024-12-30

## 2025-03-20 RX ORDER — TRETINOIN 0.1 %
CREAM (GRAM) TOPICAL
COMMUNITY
Start: 2025-03-12

## 2025-03-20 RX ORDER — MUPIROCIN 20 MG/G
OINTMENT TOPICAL
COMMUNITY
Start: 2025-02-11

## 2025-03-20 RX ORDER — ACYCLOVIR 800 MG/1
800 TABLET ORAL 3 TIMES DAILY
COMMUNITY
Start: 2025-03-05

## 2025-03-20 RX ORDER — AMOXICILLIN 500 MG
CAPSULE ORAL
COMMUNITY

## 2025-03-20 RX ORDER — MULTIVITAMIN WITH IRON
TABLET ORAL
COMMUNITY

## 2025-03-20 RX ORDER — HYDROXYZINE HYDROCHLORIDE 25 MG/1
25 TABLET, FILM COATED ORAL
COMMUNITY
Start: 2025-02-07

## 2025-03-20 RX ORDER — TRAZODONE HYDROCHLORIDE 50 MG/1
25 TABLET ORAL NIGHTLY PRN
Qty: 90 TABLET | Refills: 1 | Status: SHIPPED | OUTPATIENT
Start: 2025-03-20

## 2025-03-20 RX ORDER — PREDNISONE 10 MG/1
TABLET ORAL
COMMUNITY
Start: 2025-03-10

## 2025-03-20 RX ORDER — TRIAMCINOLONE ACETONIDE 1 MG/G
CREAM TOPICAL
COMMUNITY
Start: 2025-02-07

## 2025-03-20 SDOH — ECONOMIC STABILITY: FOOD INSECURITY: WITHIN THE PAST 12 MONTHS, YOU WORRIED THAT YOUR FOOD WOULD RUN OUT BEFORE YOU GOT MONEY TO BUY MORE.: PATIENT DECLINED

## 2025-03-20 SDOH — HEALTH STABILITY: PHYSICAL HEALTH: ON AVERAGE, HOW MANY MINUTES DO YOU ENGAGE IN EXERCISE AT THIS LEVEL?: 30 MIN

## 2025-03-20 SDOH — ECONOMIC STABILITY: FOOD INSECURITY: WITHIN THE PAST 12 MONTHS, THE FOOD YOU BOUGHT JUST DIDN'T LAST AND YOU DIDN'T HAVE MONEY TO GET MORE.: PATIENT DECLINED

## 2025-03-20 SDOH — HEALTH STABILITY: PHYSICAL HEALTH: ON AVERAGE, HOW MANY DAYS PER WEEK DO YOU ENGAGE IN MODERATE TO STRENUOUS EXERCISE (LIKE A BRISK WALK)?: 3 DAYS

## 2025-03-20 ASSESSMENT — ENCOUNTER SYMPTOMS
SINUS PAIN: 0
VOMITING: 0
RHINORRHEA: 0
ABDOMINAL PAIN: 0
NAUSEA: 0
COUGH: 0
DIARRHEA: 0
SORE THROAT: 0
BACK PAIN: 0
EYE PAIN: 0
CONSTIPATION: 0
SHORTNESS OF BREATH: 0

## 2025-03-20 ASSESSMENT — PATIENT HEALTH QUESTIONNAIRE - PHQ9
3. TROUBLE FALLING OR STAYING ASLEEP: NEARLY EVERY DAY
9. THOUGHTS THAT YOU WOULD BE BETTER OFF DEAD, OR OF HURTING YOURSELF: NOT AT ALL
1. LITTLE INTEREST OR PLEASURE IN DOING THINGS: SEVERAL DAYS
SUM OF ALL RESPONSES TO PHQ QUESTIONS 1-9: 13
SUM OF ALL RESPONSES TO PHQ QUESTIONS 1-9: 13
6. FEELING BAD ABOUT YOURSELF - OR THAT YOU ARE A FAILURE OR HAVE LET YOURSELF OR YOUR FAMILY DOWN: NEARLY EVERY DAY
SUM OF ALL RESPONSES TO PHQ QUESTIONS 1-9: 2
1. LITTLE INTEREST OR PLEASURE IN DOING THINGS: SEVERAL DAYS
8. MOVING OR SPEAKING SO SLOWLY THAT OTHER PEOPLE COULD HAVE NOTICED. OR THE OPPOSITE, BEING SO FIGETY OR RESTLESS THAT YOU HAVE BEEN MOVING AROUND A LOT MORE THAN USUAL: NOT AT ALL
2. FEELING DOWN, DEPRESSED OR HOPELESS: SEVERAL DAYS
SUM OF ALL RESPONSES TO PHQ QUESTIONS 1-9: 2
4. FEELING TIRED OR HAVING LITTLE ENERGY: NEARLY EVERY DAY
5. POOR APPETITE OR OVEREATING: MORE THAN HALF THE DAYS
2. FEELING DOWN, DEPRESSED OR HOPELESS: SEVERAL DAYS
SUM OF ALL RESPONSES TO PHQ QUESTIONS 1-9: 13
SUM OF ALL RESPONSES TO PHQ QUESTIONS 1-9: 2
SUM OF ALL RESPONSES TO PHQ QUESTIONS 1-9: 13
SUM OF ALL RESPONSES TO PHQ QUESTIONS 1-9: 2
10. IF YOU CHECKED OFF ANY PROBLEMS, HOW DIFFICULT HAVE THESE PROBLEMS MADE IT FOR YOU TO DO YOUR WORK, TAKE CARE OF THINGS AT HOME, OR GET ALONG WITH OTHER PEOPLE: SOMEWHAT DIFFICULT
7. TROUBLE CONCENTRATING ON THINGS, SUCH AS READING THE NEWSPAPER OR WATCHING TELEVISION: NOT AT ALL

## 2025-03-20 NOTE — PROGRESS NOTES
depression.    Diagnoses and all orders for this visit:    Encounter to establish care    Anxiety and depression  -     traZODone (DESYREL) 50 MG tablet; Take 0.5 tablets by mouth nightly as needed for Sleep    Insomnia, unspecified type  -     traZODone (DESYREL) 50 MG tablet; Take 0.5 tablets by mouth nightly as needed for Sleep    Interstitial cystitis  Comments:  Cont to fu with urogyDr. Sunny mason.    BMI 24.0-24.9, adult         Advised on relaxation techniques. Advised to limit or avoid stress. Advised to avoid or limit aggravating or precipitating factors. Advised to seek immediate medical attention for any chest pains, palpitations or suicidal thoughts (call [phone]).            Follow Up  Return in about 17 weeks (around 7/17/2025), or if symptoms worsen or fail to improve, for Physical with fasting labs (no pap).             Karie Shaikh, ALDEN, FNP-BC

## 2025-03-20 NOTE — PATIENT INSTRUCTIONS
Welcome our practice and to our [facility] family.  Thank you for choosing us as your health care provider.  In the coming days, you may receive a survey about your visit via text or email.  Please take a few minutes to answer these questions.   As our patient, we value you opinion and appreciate your feedback   about your [employer] experience.      Thank you    [facility]  [phone]  [employer]       Please arrive 20 minutes prior to your appointment time to allow time for checking in at the  and rooming with the medical assistant. Please bring your medication bottles at each visit.